# Patient Record
Sex: FEMALE | Race: WHITE | NOT HISPANIC OR LATINO | URBAN - METROPOLITAN AREA
[De-identification: names, ages, dates, MRNs, and addresses within clinical notes are randomized per-mention and may not be internally consistent; named-entity substitution may affect disease eponyms.]

---

## 2017-02-13 PROCEDURE — 88305 TISSUE EXAM BY PATHOLOGIST: CPT | Performed by: INTERNAL MEDICINE

## 2017-03-02 PROCEDURE — 86038 ANTINUCLEAR ANTIBODIES: CPT | Performed by: FAMILY MEDICINE

## 2017-03-02 PROCEDURE — 86431 RHEUMATOID FACTOR QUANT: CPT | Performed by: FAMILY MEDICINE

## 2017-09-26 ENCOUNTER — CHARTING TRANS (OUTPATIENT)
Dept: OBGYN | Age: 52
End: 2017-09-26

## 2017-09-26 ENCOUNTER — MYAURORA ACCOUNT LINK (OUTPATIENT)
Dept: OTHER | Age: 52
End: 2017-09-26

## 2017-11-03 ENCOUNTER — CHARTING TRANS (OUTPATIENT)
Dept: OTHER | Age: 52
End: 2017-11-03

## 2017-12-12 ENCOUNTER — IMAGING SERVICES (OUTPATIENT)
Dept: OTHER | Age: 52
End: 2017-12-12

## 2018-10-16 PROBLEM — M79.642 PAIN IN BOTH HANDS: Status: ACTIVE | Noted: 2018-10-16

## 2018-10-16 PROBLEM — G56.03 BILATERAL CARPAL TUNNEL SYNDROME: Status: ACTIVE | Noted: 2018-10-16

## 2018-10-16 PROBLEM — M79.641 PAIN IN BOTH HANDS: Status: ACTIVE | Noted: 2018-10-16

## 2018-11-28 VITALS
SYSTOLIC BLOOD PRESSURE: 126 MMHG | WEIGHT: 189 LBS | BODY MASS INDEX: 29.66 KG/M2 | DIASTOLIC BLOOD PRESSURE: 74 MMHG | HEIGHT: 67 IN

## 2019-01-12 ENCOUNTER — HOSPITAL ENCOUNTER (OUTPATIENT)
Dept: CT IMAGING | Facility: HOSPITAL | Age: 54
Discharge: HOME | End: 2019-01-12
Attending: FAMILY MEDICINE

## 2019-01-12 DIAGNOSIS — Z13.6 SCREENING FOR CARDIOVASCULAR CONDITION: ICD-10-CM

## 2020-01-02 ENCOUNTER — OFFICE VISIT (OUTPATIENT)
Dept: OBGYN | Age: 55
End: 2020-01-02

## 2020-01-02 VITALS
SYSTOLIC BLOOD PRESSURE: 122 MMHG | BODY MASS INDEX: 34.84 KG/M2 | WEIGHT: 222 LBS | DIASTOLIC BLOOD PRESSURE: 80 MMHG | HEIGHT: 67 IN

## 2020-01-02 DIAGNOSIS — Z01.419 WELL WOMAN EXAM WITH ROUTINE GYNECOLOGICAL EXAM: Primary | ICD-10-CM

## 2020-01-02 DIAGNOSIS — Z11.51 SPECIAL SCREENING EXAMINATION FOR HUMAN PAPILLOMAVIRUS (HPV): ICD-10-CM

## 2020-01-02 DIAGNOSIS — Z12.31 ENCOUNTER FOR SCREENING MAMMOGRAM FOR MALIGNANT NEOPLASM OF BREAST: ICD-10-CM

## 2020-01-02 PROCEDURE — 99396 PREV VISIT EST AGE 40-64: CPT | Performed by: OBSTETRICS & GYNECOLOGY

## 2020-01-02 PROCEDURE — 88142 CYTOPATH C/V THIN LAYER: CPT | Performed by: PATHOLOGY

## 2020-01-02 RX ORDER — CHOLECALCIFEROL (VITAMIN D3) 125 MCG
CAPSULE ORAL
COMMUNITY

## 2020-01-02 RX ORDER — CHLORAL HYDRATE 500 MG
CAPSULE ORAL
COMMUNITY

## 2020-01-08 LAB — AP REPORT: NORMAL

## 2020-01-14 LAB — HPV I/H RISK 4 DNA CVX QL NAA+PROBE: NORMAL

## 2020-08-11 ENCOUNTER — IMAGING SERVICES (OUTPATIENT)
Dept: OTHER | Age: 55
End: 2020-08-11

## 2020-10-26 ENCOUNTER — IMAGING SERVICES (OUTPATIENT)
Dept: MAMMOGRAPHY | Age: 55
End: 2020-10-26
Attending: OBSTETRICS & GYNECOLOGY

## 2020-10-26 DIAGNOSIS — Z12.31 VISIT FOR SCREENING MAMMOGRAM: ICD-10-CM

## 2020-10-26 PROCEDURE — 77067 SCR MAMMO BI INCL CAD: CPT | Performed by: RADIOLOGY

## 2020-10-26 PROCEDURE — 77063 BREAST TOMOSYNTHESIS BI: CPT | Performed by: RADIOLOGY

## 2021-01-26 PROBLEM — S42.201D CLOSED FRACTURE OF PROXIMAL END OF RIGHT HUMERUS WITH ROUTINE HEALING: Status: ACTIVE | Noted: 2021-01-26

## 2021-08-02 PROBLEM — U07.1 LAB TEST POSITIVE FOR DETECTION OF COVID-19 VIRUS: Status: ACTIVE | Noted: 2021-08-02

## 2021-12-02 PROBLEM — R73.09 ELEVATED HEMOGLOBIN A1C: Status: ACTIVE | Noted: 2021-12-02

## 2021-12-02 PROBLEM — E55.9 VITAMIN D DEFICIENCY: Status: ACTIVE | Noted: 2021-12-02

## 2022-02-23 ENCOUNTER — OFFICE VISIT (OUTPATIENT)
Dept: OBGYN | Age: 57
End: 2022-02-23

## 2022-02-23 VITALS
SYSTOLIC BLOOD PRESSURE: 150 MMHG | DIASTOLIC BLOOD PRESSURE: 88 MMHG | HEIGHT: 67 IN | BODY MASS INDEX: 35.31 KG/M2 | WEIGHT: 225 LBS | RESPIRATION RATE: 16 BRPM | TEMPERATURE: 97.2 F

## 2022-02-23 DIAGNOSIS — Z01.419 ENCOUNTER FOR ROUTINE GYNECOLOGICAL EXAMINATION WITH PAPANICOLAOU SMEAR OF CERVIX: Primary | ICD-10-CM

## 2022-02-23 DIAGNOSIS — Z01.419 GYNECOLOGIC EXAM NORMAL: ICD-10-CM

## 2022-02-23 DIAGNOSIS — Z11.51 SCREENING FOR HUMAN PAPILLOMAVIRUS (HPV): ICD-10-CM

## 2022-02-23 PROCEDURE — 88142 CYTOPATH C/V THIN LAYER: CPT | Performed by: PATHOLOGY

## 2022-02-23 PROCEDURE — 87624 HPV HI-RISK TYP POOLED RSLT: CPT | Performed by: OBSTETRICS & GYNECOLOGY

## 2022-02-23 PROCEDURE — 99396 PREV VISIT EST AGE 40-64: CPT | Performed by: OBSTETRICS & GYNECOLOGY

## 2022-02-23 PROCEDURE — 87624 HPV HI-RISK TYP POOLED RSLT: CPT | Performed by: CLINICAL MEDICAL LABORATORY

## 2022-02-23 PROCEDURE — PSEU9939 HPV, HIGH RISK: Performed by: CLINICAL MEDICAL LABORATORY

## 2022-02-23 RX ORDER — ALBUTEROL SULFATE 90 UG/1
AEROSOL, METERED RESPIRATORY (INHALATION)
COMMUNITY
Start: 2021-11-05

## 2022-02-23 RX ORDER — MULTIVIT WITH MINERALS/LUTEIN
1000 TABLET ORAL DAILY
COMMUNITY

## 2022-02-23 RX ORDER — FLUTICASONE PROPIONATE 50 MCG
1 SPRAY, SUSPENSION (ML) NASAL
COMMUNITY

## 2022-02-28 ENCOUNTER — TELEPHONE (OUTPATIENT)
Dept: OBGYN | Age: 57
End: 2022-02-28

## 2022-02-28 DIAGNOSIS — N64.4 BREAST PAIN, RIGHT: Primary | ICD-10-CM

## 2022-02-28 LAB — AP REPORT: NORMAL

## 2022-03-01 ENCOUNTER — LAB REQUISITION (OUTPATIENT)
Dept: LAB | Age: 57
End: 2022-03-01

## 2022-03-01 DIAGNOSIS — Z11.51 ENCOUNTER FOR SCREENING FOR HUMAN PAPILLOMAVIRUS (HPV): ICD-10-CM

## 2022-03-01 DIAGNOSIS — Z01.419 ENCOUNTER FOR GYNECOLOGICAL EXAMINATION (GENERAL) (ROUTINE) WITHOUT ABNORMAL FINDINGS: ICD-10-CM

## 2022-03-02 PROBLEM — F32.A CHRONIC DEPRESSION: Status: ACTIVE | Noted: 2022-03-02

## 2022-03-02 PROBLEM — G25.81 RESTLESS LEGS SYNDROME (RLS): Status: ACTIVE | Noted: 2022-03-02

## 2022-03-02 LAB
HPV16+18+45 E6+E7MRNA CVX NAA+PROBE: NEGATIVE
HPV16+18+45 E6+E7MRNA CVX NAA+PROBE: NEGATIVE
Lab: NORMAL
Lab: NORMAL

## 2022-09-29 PROBLEM — S42.201D CLOSED FRACTURE OF PROXIMAL END OF RIGHT HUMERUS WITH ROUTINE HEALING: Status: RESOLVED | Noted: 2021-01-26 | Resolved: 2022-09-29

## 2022-09-29 PROBLEM — J45.990 EXERCISE-INDUCED ASTHMA: Status: ACTIVE | Noted: 2022-09-29

## 2022-09-29 PROBLEM — R03.0 ELEVATED BLOOD PRESSURE READING: Status: ACTIVE | Noted: 2022-09-29

## 2022-09-29 PROBLEM — G47.61 PERIODIC LIMB MOVEMENT DISORDER: Status: ACTIVE | Noted: 2022-09-29

## 2022-09-29 PROBLEM — J45.990 EXERCISE-INDUCED ASTHMA (HCC): Status: ACTIVE | Noted: 2022-09-29

## 2022-09-29 PROBLEM — F32.9 MAJOR DEPRESSIVE DISORDER: Chronic | Status: ACTIVE | Noted: 2022-09-29

## 2022-09-29 PROBLEM — F40.10 SOCIAL ANXIETY DISORDER: Chronic | Status: ACTIVE | Noted: 2022-09-29

## 2022-09-29 PROBLEM — U07.1 LAB TEST POSITIVE FOR DETECTION OF COVID-19 VIRUS: Status: RESOLVED | Noted: 2021-08-02 | Resolved: 2022-09-29

## 2022-10-07 ENCOUNTER — LAB ENCOUNTER (OUTPATIENT)
Dept: LAB | Age: 57
End: 2022-10-07
Attending: PHYSICIAN ASSISTANT
Payer: COMMERCIAL

## 2022-10-07 DIAGNOSIS — F40.10 SOCIAL ANXIETY DISORDER: ICD-10-CM

## 2022-10-07 DIAGNOSIS — R53.83 FATIGUE, UNSPECIFIED TYPE: ICD-10-CM

## 2022-10-07 DIAGNOSIS — E55.9 VITAMIN D DEFICIENCY: ICD-10-CM

## 2022-10-07 DIAGNOSIS — Z13.89 SCREENING FOR SUBSTANCE ABUSE: ICD-10-CM

## 2022-10-07 DIAGNOSIS — Z13.0 SCREENING, ANEMIA, DEFICIENCY, IRON: ICD-10-CM

## 2022-10-07 DIAGNOSIS — Z13.1 SCREENING FOR DIABETES MELLITUS: ICD-10-CM

## 2022-10-07 DIAGNOSIS — Z13.29 SCREENING FOR THYROID DISORDER: ICD-10-CM

## 2022-10-07 DIAGNOSIS — F33.1 MODERATE EPISODE OF RECURRENT MAJOR DEPRESSIVE DISORDER (HCC): ICD-10-CM

## 2022-10-07 LAB
ALBUMIN SERPL-MCNC: 3.8 G/DL (ref 3.4–5)
ALBUMIN/GLOB SERPL: 1 {RATIO} (ref 1–2)
ALP LIVER SERPL-CCNC: 52 U/L
ALT SERPL-CCNC: 25 U/L
AMPHET UR QL SCN: NEGATIVE
ANION GAP SERPL CALC-SCNC: 5 MMOL/L (ref 0–18)
AST SERPL-CCNC: 18 U/L (ref 15–37)
BARBITURATES UR QL SCN: NEGATIVE
BENZODIAZ UR QL SCN: NEGATIVE
BILIRUB SERPL-MCNC: 0.6 MG/DL (ref 0.1–2)
BUN BLD-MCNC: 15 MG/DL (ref 7–18)
CALCIUM BLD-MCNC: 9 MG/DL (ref 8.5–10.1)
CANNABINOIDS UR QL SCN: NEGATIVE
CHLORIDE SERPL-SCNC: 108 MMOL/L (ref 98–112)
CO2 SERPL-SCNC: 26 MMOL/L (ref 21–32)
COCAINE UR QL: NEGATIVE
CREAT BLD-MCNC: 0.87 MG/DL
CREAT UR-SCNC: 78.9 MG/DL
ERYTHROCYTE [DISTWIDTH] IN BLOOD BY AUTOMATED COUNT: 12.6 %
ETHANOL UR-MCNC: NEGATIVE MG/DL
FASTING STATUS PATIENT QL REPORTED: YES
GFR SERPLBLD BASED ON 1.73 SQ M-ARVRAT: 78 ML/MIN/1.73M2 (ref 60–?)
GLOBULIN PLAS-MCNC: 3.8 G/DL (ref 2.8–4.4)
GLUCOSE BLD-MCNC: 90 MG/DL (ref 70–99)
HCT VFR BLD AUTO: 40 %
HGB BLD-MCNC: 12.9 G/DL
MCH RBC QN AUTO: 29.5 PG (ref 26–34)
MCHC RBC AUTO-ENTMCNC: 32.3 G/DL (ref 31–37)
MCV RBC AUTO: 91.3 FL
OPIATES UR QL SCN: NEGATIVE
OSMOLALITY SERPL CALC.SUM OF ELEC: 288 MOSM/KG (ref 275–295)
PCP UR QL SCN: NEGATIVE
PLATELET # BLD AUTO: 287 10(3)UL (ref 150–450)
POTASSIUM SERPL-SCNC: 3.9 MMOL/L (ref 3.5–5.1)
PROT SERPL-MCNC: 7.6 G/DL (ref 6.4–8.2)
RBC # BLD AUTO: 4.38 X10(6)UL
SODIUM SERPL-SCNC: 139 MMOL/L (ref 136–145)
T4 FREE SERPL-MCNC: 0.8 NG/DL (ref 0.8–1.7)
TSI SER-ACNC: 1.35 MIU/ML (ref 0.36–3.74)
VIT D+METAB SERPL-MCNC: 38.9 NG/ML (ref 30–100)
WBC # BLD AUTO: 4.9 X10(3) UL (ref 4–11)

## 2022-10-07 PROCEDURE — 84443 ASSAY THYROID STIM HORMONE: CPT

## 2022-10-07 PROCEDURE — 80307 DRUG TEST PRSMV CHEM ANLYZR: CPT

## 2022-10-07 PROCEDURE — 85027 COMPLETE CBC AUTOMATED: CPT

## 2022-10-07 PROCEDURE — 36415 COLL VENOUS BLD VENIPUNCTURE: CPT

## 2022-10-07 PROCEDURE — 84439 ASSAY OF FREE THYROXINE: CPT

## 2022-10-07 PROCEDURE — 82306 VITAMIN D 25 HYDROXY: CPT

## 2022-10-07 PROCEDURE — 80053 COMPREHEN METABOLIC PANEL: CPT

## 2022-11-16 PROBLEM — F34.1 PERSISTENT DEPRESSIVE DISORDER: Chronic | Status: ACTIVE | Noted: 2022-11-16

## 2023-03-01 ENCOUNTER — OFFICE VISIT (OUTPATIENT)
Dept: OBGYN | Age: 58
End: 2023-03-01

## 2023-03-01 VITALS
HEIGHT: 67 IN | BODY MASS INDEX: 36.96 KG/M2 | DIASTOLIC BLOOD PRESSURE: 70 MMHG | SYSTOLIC BLOOD PRESSURE: 138 MMHG | WEIGHT: 235.5 LBS

## 2023-03-01 DIAGNOSIS — Z12.31 ENCOUNTER FOR SCREENING MAMMOGRAM FOR BREAST CANCER: ICD-10-CM

## 2023-03-01 DIAGNOSIS — Z01.419 GYNECOLOGIC EXAM NORMAL: Primary | ICD-10-CM

## 2023-03-01 PROCEDURE — 99396 PREV VISIT EST AGE 40-64: CPT | Performed by: OBSTETRICS & GYNECOLOGY

## 2023-03-01 RX ORDER — GABAPENTIN 300 MG/1
300 CAPSULE ORAL 3 TIMES DAILY
COMMUNITY
Start: 2023-02-15

## 2023-03-01 RX ORDER — FLUOXETINE HYDROCHLORIDE 40 MG/1
40 CAPSULE ORAL
COMMUNITY
Start: 2023-02-22

## 2023-04-26 ENCOUNTER — TELEPHONE (OUTPATIENT)
Dept: OBGYN | Age: 58
End: 2023-04-26

## 2024-03-28 ENCOUNTER — APPOINTMENT (OUTPATIENT)
Dept: GENERAL RADIOLOGY | Facility: HOSPITAL | Age: 59
End: 2024-03-28
Attending: EMERGENCY MEDICINE
Payer: COMMERCIAL

## 2024-03-28 ENCOUNTER — HOSPITAL ENCOUNTER (EMERGENCY)
Facility: HOSPITAL | Age: 59
Discharge: HOME | End: 2024-03-28
Attending: EMERGENCY MEDICINE
Payer: COMMERCIAL

## 2024-03-28 VITALS
BODY MASS INDEX: 38 KG/M2 | SYSTOLIC BLOOD PRESSURE: 132 MMHG | RESPIRATION RATE: 16 BRPM | WEIGHT: 240 LBS | HEART RATE: 90 BPM | OXYGEN SATURATION: 99 % | TEMPERATURE: 99 F | DIASTOLIC BLOOD PRESSURE: 71 MMHG

## 2024-03-28 DIAGNOSIS — R33.9 URINARY RETENTION: ICD-10-CM

## 2024-03-28 DIAGNOSIS — K59.00 CONSTIPATION, UNSPECIFIED CONSTIPATION TYPE: ICD-10-CM

## 2024-03-28 DIAGNOSIS — K56.41 FECAL IMPACTION (HCC): Primary | ICD-10-CM

## 2024-03-28 LAB
ANION GAP SERPL CALC-SCNC: 7 MMOL/L (ref 0–18)
BUN BLD-MCNC: 15 MG/DL (ref 9–23)
CALCIUM BLD-MCNC: 9.5 MG/DL (ref 8.5–10.1)
CHLORIDE SERPL-SCNC: 106 MMOL/L (ref 98–112)
CO2 SERPL-SCNC: 23 MMOL/L (ref 21–32)
CREAT BLD-MCNC: 1.17 MG/DL
EGFRCR SERPLBLD CKD-EPI 2021: 54 ML/MIN/1.73M2 (ref 60–?)
GLUCOSE BLD-MCNC: 123 MG/DL (ref 70–99)
OSMOLALITY SERPL CALC.SUM OF ELEC: 284 MOSM/KG (ref 275–295)
POTASSIUM SERPL-SCNC: 4.4 MMOL/L (ref 3.5–5.1)
SODIUM SERPL-SCNC: 136 MMOL/L (ref 136–145)

## 2024-03-28 PROCEDURE — 74019 RADEX ABDOMEN 2 VIEWS: CPT | Performed by: EMERGENCY MEDICINE

## 2024-03-28 PROCEDURE — 80048 BASIC METABOLIC PNL TOTAL CA: CPT | Performed by: EMERGENCY MEDICINE

## 2024-03-28 PROCEDURE — 36415 COLL VENOUS BLD VENIPUNCTURE: CPT

## 2024-03-28 PROCEDURE — 99284 EMERGENCY DEPT VISIT MOD MDM: CPT

## 2024-03-28 RX ORDER — POLYETHYLENE GLYCOL 3350 17 G/17G
17 POWDER, FOR SOLUTION ORAL DAILY PRN
Qty: 10 EACH | Refills: 0 | Status: SHIPPED | OUTPATIENT
Start: 2024-03-28 | End: 2024-04-04

## 2024-03-28 NOTE — ED INITIAL ASSESSMENT (HPI)
C/o of constipation, last BM yesterday, tried OTC medications at home with no changes.  now with urinary retention started this morning at 0800

## 2024-03-29 NOTE — ED PROVIDER NOTES
Patient Seen in: [facility] Emergency Department      History     Chief Complaint   Patient presents with    Constipation    Urinary Symptoms     Stated Complaint: constipation/urinary retention since this morning    Subjective:   HPI    59-year-old female presents emergency room for evaluation of constipation and urinary retention.  Patient states she has not moved her bowels since yesterday, states she has been trying to move her bowels today and feels there is a large \"stool ball \"stuck in her lower rectum.  Patient states she also has not been able to urinate starting this morning.  Patient denies back pain.  Denies saddle anesthesia.  Denies fevers or chills.    Objective:   Past Medical History:   Diagnosis Date    Closed fracture of proximal end of right humerus with routine healing 1/26/2021    Lab test positive for detection of COVID-19 virus 8/2/2021    Pt reported 7/21/21 covid test positive at external facility     Melanoma (HCC) 2000    melanoma - right middle finger              Past Surgical History:   Procedure Laterality Date    CARPAL TUNNEL RELEASE Right 2022    CARPAL TUNNEL RELEASE Left 2022    COLONOSCOPY  02/2017     diverticulosis, hemorrhoids, polyp (7 mm serrated polyp). repeat 5 years       COLONOSCOPY N/A 02/13/2017    Procedure: COLONOSCOPY, POSSIBLE BIOPSY, POSSIBLE POLYPECTOMY 95260;  Surgeon: Gatito Wylie MD;  [place] [facility] [place] [facility] [place], [facility]    CT HEART W/ CALCIUM SCORING  1/24/14 - [facility] [place]    score 0    OTHER SURGICAL HISTORY      sentinel node biopsy    OTHER SURGICAL HISTORY      benign breast lump    OTHER SURGICAL HISTORY      many mole removals    PLANTAR FASCIA RELEASE Right     Gastrocnemius recession                Social History     Socioeconomic History    Marital status:    Occupational History    Occupation: homemaker   Tobacco Use    Smoking status: Never    Smokeless tobacco: Never   Substance and Sexual Activity    Alcohol use: No     Comment: 2  monthly    Drug use: No   Other Topics Concern    Seat Belt Yes   Social History Narrative    .  Homemaker.  Non-smoker.  No alcohol use.              Review of Systems    Positive for stated complaint: constipation/urinary retention since this morning  Other systems are as noted in HPI.  Constitutional and vital signs reviewed.      All other systems reviewed and negative except as noted above.    Physical Exam     ED Triage Vitals [03/28/24 1807]   BP (!) 164/93   Pulse 98   Resp 18   Temp 99.2 °F (37.3 °C)   Temp src Temporal   SpO2 98 %   O2 Device        Current:BP (!) 164/93   Pulse 98   Temp 99.2 °F (37.3 °C) (Temporal)   Resp 18   Wt 108.9 kg   LMP  (LMP Unknown)   SpO2 98%   BMI 37.67 kg/m²         Physical Exam    GENERAL: Patient is awake, alert, well-appearing, in no acute distress.  HEENT: no scleral icterus.  Mucous membranes are moist,  HEART: Regular rate and rhythm, no murmurs.  LUNGS: Clear to auscultation bilaterally.  No Rales, no rhonchi, no wheezing, no stridor.  ABDOMEN: Soft, nondistended,non tender, bowel sounds are present, no rebound, no rigidity, no guarding.no pulsatile masses. No CVA tenderness  Rectal exam: Performed with nurse Sara as chaperone-good rectal tone, large amount of firm stool in rectal vault  EXTREMITIES: No peripheral edema, no calf tenderness  NEUROLOGIC EXAM: No saddle anesthesia, muscle strength 5/5 bilateral lower extremities      ED Course     Labs Reviewed   BASIC METABOLIC PANEL (8) - Abnormal; Notable for the following components:       Result Value    Glucose 123 (*)     Creatinine 1.17 (*)     eGFR-Cr 54 (*)     All other components within normal limits        Fecal disimpaction note: Performed with nurse chaperone Sara.  Large amount of stool was removed, patient tolerated procedure well.              MDM        Differential diagnosis before testing includes but not limited to obstruction, fecal impaction, acute kidney injury, electrolyte  abnormality, which is a medical condition that poses a threat to life/function    Radiographic images  I personally reviewed the radiographs and my individual interpretation shows stool noted in colon, no free air  I also reviewed the official reports that showed large amount of fecal material throughout colon may be seen with constipation, nonspecific bowel gas pattern no free air      Course of Events during Emergency Room Visit include Smith catheter was placed by nursing staff to decompress the bladder after bedside bladder scan revealed 1000 cc of urine.  Patient reports feeling much better after this intervention.  Digital disimpaction was performed.  Patient then had soap side enema performed with large resultant bowel movement.  Patient is now urinating without difficulty on her own.  Chemistry was unremarkable.  I discussed continue supportive care, will prescribe MiraLAX.  Stable hydrated.  Follow-up with primary care return if any change or symptoms.  Patient well-appearing agrees plan discharge good condition with     Shared decision making was utilized       Discharge  I have discussed with the patient the results of test, differential diagnosis, treatment plan, warning signs and symptoms which should prompt immediate return.  They expressed understanding of these instructions and agrees to the following plan provided.  They were given written discharge instructions and agrees to return for any concerns and voiced understanding and all questions were answered.    Note to patient: The 21st Century Cures Act makes medical notes like these available to patients in the interest of transparency. However, this is a medical document intended as peer to peer communication. It is written in medical language and may contain abbreviations or verbiage that are unfamiliar. It may appear blunt or direct. Medical documents are intended to carry relevant information, facts as evident, and the clinical opinion of  the practitioner.                                            Medical Decision Making      Disposition and Plan     Clinical Impression:  1. Fecal impaction (HCC)    2. Constipation, unspecified constipation type    3. Urinary retention         Disposition:  Discharge  3/28/2024 11:22 pm    Follow-up:  Sunshine Lance MD  [address] [address]  [phone]    Follow up in 2 day(s)            Medications Prescribed:  Current Discharge Medication List        START taking these medications    Details   polyethylene glycol, PEG 3350, 17 g Oral Powd Pack Take 17 g by mouth daily as needed.  Qty: 10 each, Refills: 0

## 2024-05-16 ENCOUNTER — HOSPITAL ENCOUNTER (INPATIENT)
Facility: HOSPITAL | Age: 59
LOS: 1 days | Discharge: HOME | DRG: 189 | End: 2024-05-19
Attending: EMERGENCY MEDICINE | Admitting: INTERNAL MEDICINE

## 2024-05-16 ENCOUNTER — APPOINTMENT (OUTPATIENT)
Dept: GENERAL RADIOLOGY | Facility: HOSPITAL | Age: 59
DRG: 189 | End: 2024-05-16

## 2024-05-16 ENCOUNTER — APPOINTMENT (OUTPATIENT)
Dept: GENERAL RADIOLOGY | Facility: HOSPITAL | Age: 59
DRG: 189 | End: 2024-05-16
Attending: EMERGENCY MEDICINE

## 2024-05-16 DIAGNOSIS — J45.51 SEVERE PERSISTENT ASTHMA WITH EXACERBATION (HCC): Primary | ICD-10-CM

## 2024-05-16 PROBLEM — R73.9 HYPERGLYCEMIA: Status: ACTIVE | Noted: 2024-05-16

## 2024-05-16 LAB
ALBUMIN SERPL-MCNC: 4 G/DL (ref 3.4–5)
ALBUMIN/GLOB SERPL: 1 {RATIO} (ref 1–2)
ALP LIVER SERPL-CCNC: 60 U/L
ALT SERPL-CCNC: 27 U/L
ANION GAP SERPL CALC-SCNC: 3 MMOL/L (ref 0–18)
AST SERPL-CCNC: 21 U/L (ref 15–37)
BASOPHILS # BLD AUTO: 0.03 X10(3) UL (ref 0–0.2)
BASOPHILS NFR BLD AUTO: 0.5 %
BILIRUB SERPL-MCNC: 0.4 MG/DL (ref 0.1–2)
BUN BLD-MCNC: 20 MG/DL (ref 9–23)
CALCIUM BLD-MCNC: 9.1 MG/DL (ref 8.5–10.1)
CHLORIDE SERPL-SCNC: 106 MMOL/L (ref 98–112)
CO2 SERPL-SCNC: 28 MMOL/L (ref 21–32)
CREAT BLD-MCNC: 1.13 MG/DL
EGFRCR SERPLBLD CKD-EPI 2021: 56 ML/MIN/1.73M2 (ref 60–?)
EOSINOPHIL # BLD AUTO: 0.29 X10(3) UL (ref 0–0.7)
EOSINOPHIL NFR BLD AUTO: 4.8 %
ERYTHROCYTE [DISTWIDTH] IN BLOOD BY AUTOMATED COUNT: 13 %
FLUAV + FLUBV RNA SPEC NAA+PROBE: NEGATIVE
FLUAV + FLUBV RNA SPEC NAA+PROBE: NEGATIVE
GLOBULIN PLAS-MCNC: 3.9 G/DL (ref 2.8–4.4)
GLUCOSE BLD-MCNC: 109 MG/DL (ref 70–99)
HCT VFR BLD AUTO: 40.7 %
HGB BLD-MCNC: 14.1 G/DL
IMM GRANULOCYTES # BLD AUTO: 0.02 X10(3) UL (ref 0–1)
IMM GRANULOCYTES NFR BLD: 0.3 %
LYMPHOCYTES # BLD AUTO: 1.28 X10(3) UL (ref 1–4)
LYMPHOCYTES NFR BLD AUTO: 21.1 %
MCH RBC QN AUTO: 29.8 PG (ref 26–34)
MCHC RBC AUTO-ENTMCNC: 34.6 G/DL (ref 31–37)
MCV RBC AUTO: 86 FL
MONOCYTES # BLD AUTO: 1.09 X10(3) UL (ref 0.1–1)
MONOCYTES NFR BLD AUTO: 18 %
NEUTROPHILS # BLD AUTO: 3.36 X10 (3) UL (ref 1.5–7.7)
NEUTROPHILS # BLD AUTO: 3.36 X10(3) UL (ref 1.5–7.7)
NEUTROPHILS NFR BLD AUTO: 55.3 %
OSMOLALITY SERPL CALC.SUM OF ELEC: 287 MOSM/KG (ref 275–295)
PLATELET # BLD AUTO: 242 10(3)UL (ref 150–450)
POTASSIUM SERPL-SCNC: 3.8 MMOL/L (ref 3.5–5.1)
PROT SERPL-MCNC: 7.9 G/DL (ref 6.4–8.2)
RBC # BLD AUTO: 4.73 X10(6)UL
RSV RNA SPEC NAA+PROBE: NEGATIVE
SARS-COV-2 RNA RESP QL NAA+PROBE: NOT DETECTED
SODIUM SERPL-SCNC: 137 MMOL/L (ref 136–145)
WBC # BLD AUTO: 6.1 X10(3) UL (ref 4–11)

## 2024-05-16 PROCEDURE — 85025 COMPLETE CBC W/AUTO DIFF WBC: CPT | Performed by: EMERGENCY MEDICINE

## 2024-05-16 PROCEDURE — 0241U SARS-COV-2/FLU A AND B/RSV BY PCR (GENEXPERT): CPT | Performed by: EMERGENCY MEDICINE

## 2024-05-16 PROCEDURE — 71045 X-RAY EXAM CHEST 1 VIEW: CPT | Performed by: EMERGENCY MEDICINE

## 2024-05-16 PROCEDURE — 94645 CONT INHLJ TX EACH ADDL HOUR: CPT

## 2024-05-16 PROCEDURE — 94644 CONT INHLJ TX 1ST HOUR: CPT

## 2024-05-16 PROCEDURE — 99285 EMERGENCY DEPT VISIT HI MDM: CPT

## 2024-05-16 PROCEDURE — 96374 THER/PROPH/DIAG INJ IV PUSH: CPT

## 2024-05-16 PROCEDURE — 93010 ELECTROCARDIOGRAM REPORT: CPT

## 2024-05-16 PROCEDURE — 80053 COMPREHEN METABOLIC PANEL: CPT | Performed by: EMERGENCY MEDICINE

## 2024-05-16 PROCEDURE — 93005 ELECTROCARDIOGRAM TRACING: CPT

## 2024-05-16 RX ORDER — IPRATROPIUM BROMIDE AND ALBUTEROL SULFATE 2.5; .5 MG/3ML; MG/3ML
3 SOLUTION RESPIRATORY (INHALATION)
Status: DISCONTINUED | OUTPATIENT
Start: 2024-05-16 | End: 2024-05-17

## 2024-05-16 RX ORDER — METHYLPREDNISOLONE SODIUM SUCCINATE 125 MG/2ML
125 INJECTION, POWDER, LYOPHILIZED, FOR SOLUTION INTRAMUSCULAR; INTRAVENOUS ONCE
Status: COMPLETED | OUTPATIENT
Start: 2024-05-16 | End: 2024-05-16

## 2024-05-17 LAB
ANION GAP SERPL CALC-SCNC: 8 MMOL/L (ref 0–18)
ATRIAL RATE: 88 BPM
BASOPHILS # BLD AUTO: 0.02 X10(3) UL (ref 0–0.2)
BASOPHILS NFR BLD AUTO: 0.3 %
BUN BLD-MCNC: 19 MG/DL (ref 9–23)
CALCIUM BLD-MCNC: 9.1 MG/DL (ref 8.5–10.1)
CHLORIDE SERPL-SCNC: 106 MMOL/L (ref 98–112)
CO2 SERPL-SCNC: 22 MMOL/L (ref 21–32)
CREAT BLD-MCNC: 1.1 MG/DL
EGFRCR SERPLBLD CKD-EPI 2021: 58 ML/MIN/1.73M2 (ref 60–?)
EOSINOPHIL # BLD AUTO: 0 X10(3) UL (ref 0–0.7)
EOSINOPHIL NFR BLD AUTO: 0 %
ERYTHROCYTE [DISTWIDTH] IN BLOOD BY AUTOMATED COUNT: 13.1 %
GLUCOSE BLD-MCNC: 162 MG/DL (ref 70–99)
HCT VFR BLD AUTO: 43.4 %
HGB BLD-MCNC: 14 G/DL
IMM GRANULOCYTES # BLD AUTO: 0.03 X10(3) UL (ref 0–1)
IMM GRANULOCYTES NFR BLD: 0.5 %
LYMPHOCYTES # BLD AUTO: 0.92 X10(3) UL (ref 1–4)
LYMPHOCYTES NFR BLD AUTO: 15.9 %
MAGNESIUM SERPL-MCNC: 2.3 MG/DL (ref 1.6–2.6)
MCH RBC QN AUTO: 28.8 PG (ref 26–34)
MCHC RBC AUTO-ENTMCNC: 32.3 G/DL (ref 31–37)
MCV RBC AUTO: 89.3 FL
MONOCYTES # BLD AUTO: 0.16 X10(3) UL (ref 0.1–1)
MONOCYTES NFR BLD AUTO: 2.8 %
NEUTROPHILS # BLD AUTO: 4.66 X10 (3) UL (ref 1.5–7.7)
NEUTROPHILS # BLD AUTO: 4.66 X10(3) UL (ref 1.5–7.7)
NEUTROPHILS NFR BLD AUTO: 80.5 %
OSMOLALITY SERPL CALC.SUM OF ELEC: 288 MOSM/KG (ref 275–295)
P AXIS: 57 DEGREES
P-R INTERVAL: 150 MS
PLATELET # BLD AUTO: 279 10(3)UL (ref 150–450)
POTASSIUM SERPL-SCNC: 4.1 MMOL/L (ref 3.5–5.1)
Q-T INTERVAL: 384 MS
QRS DURATION: 98 MS
QTC CALCULATION (BEZET): 464 MS
R AXIS: 38 DEGREES
RBC # BLD AUTO: 4.86 X10(6)UL
SODIUM SERPL-SCNC: 136 MMOL/L (ref 136–145)
T AXIS: 43 DEGREES
VENTRICULAR RATE: 88 BPM
WBC # BLD AUTO: 5.8 X10(3) UL (ref 4–11)

## 2024-05-17 PROCEDURE — 94640 AIRWAY INHALATION TREATMENT: CPT

## 2024-05-17 PROCEDURE — 94799 UNLISTED PULMONARY SVC/PX: CPT

## 2024-05-17 PROCEDURE — 83735 ASSAY OF MAGNESIUM: CPT | Performed by: INTERNAL MEDICINE

## 2024-05-17 PROCEDURE — 85025 COMPLETE CBC W/AUTO DIFF WBC: CPT | Performed by: INTERNAL MEDICINE

## 2024-05-17 PROCEDURE — 80048 BASIC METABOLIC PNL TOTAL CA: CPT | Performed by: INTERNAL MEDICINE

## 2024-05-17 PROCEDURE — 94660 CPAP INITIATION&MGMT: CPT

## 2024-05-17 RX ORDER — METHYLPREDNISOLONE SODIUM SUCCINATE 40 MG/ML
40 INJECTION, POWDER, LYOPHILIZED, FOR SOLUTION INTRAMUSCULAR; INTRAVENOUS EVERY 8 HOURS
Status: DISCONTINUED | OUTPATIENT
Start: 2024-05-17 | End: 2024-05-19

## 2024-05-17 RX ORDER — ACETAMINOPHEN 500 MG
500 TABLET ORAL EVERY 4 HOURS PRN
Status: DISCONTINUED | OUTPATIENT
Start: 2024-05-17 | End: 2024-05-19

## 2024-05-17 RX ORDER — POLYETHYLENE GLYCOL 3350 17 G/17G
17 POWDER, FOR SOLUTION ORAL DAILY PRN
Status: DISCONTINUED | OUTPATIENT
Start: 2024-05-17 | End: 2024-05-19

## 2024-05-17 RX ORDER — ONDANSETRON 2 MG/ML
4 INJECTION INTRAMUSCULAR; INTRAVENOUS EVERY 6 HOURS PRN
Status: DISCONTINUED | OUTPATIENT
Start: 2024-05-17 | End: 2024-05-19

## 2024-05-17 RX ORDER — HEPARIN SODIUM 5000 [USP'U]/ML
5000 INJECTION, SOLUTION INTRAVENOUS; SUBCUTANEOUS EVERY 8 HOURS SCHEDULED
Status: DISCONTINUED | OUTPATIENT
Start: 2024-05-17 | End: 2024-05-19

## 2024-05-17 RX ORDER — ENEMA 19; 7 G/133ML; G/133ML
1 ENEMA RECTAL ONCE AS NEEDED
Status: DISCONTINUED | OUTPATIENT
Start: 2024-05-17 | End: 2024-05-19

## 2024-05-17 RX ORDER — BISACODYL 10 MG
10 SUPPOSITORY, RECTAL RECTAL
Status: DISCONTINUED | OUTPATIENT
Start: 2024-05-17 | End: 2024-05-19

## 2024-05-17 RX ORDER — SENNOSIDES 8.6 MG
17.2 TABLET ORAL NIGHTLY PRN
Status: DISCONTINUED | OUTPATIENT
Start: 2024-05-17 | End: 2024-05-19

## 2024-05-17 RX ORDER — IPRATROPIUM BROMIDE AND ALBUTEROL SULFATE 2.5; .5 MG/3ML; MG/3ML
3 SOLUTION RESPIRATORY (INHALATION)
Status: DISCONTINUED | OUTPATIENT
Start: 2024-05-17 | End: 2024-05-19

## 2024-05-17 RX ORDER — IPRATROPIUM BROMIDE AND ALBUTEROL SULFATE 2.5; .5 MG/3ML; MG/3ML
3 SOLUTION RESPIRATORY (INHALATION) EVERY 4 HOURS PRN
Status: DISCONTINUED | OUTPATIENT
Start: 2024-05-17 | End: 2024-05-19

## 2024-05-17 RX ORDER — PROCHLORPERAZINE EDISYLATE 5 MG/ML
5 INJECTION INTRAMUSCULAR; INTRAVENOUS EVERY 8 HOURS PRN
Status: DISCONTINUED | OUTPATIENT
Start: 2024-05-17 | End: 2024-05-19

## 2024-05-17 NOTE — ED PROVIDER NOTES
Patient Seen in: [facility] Emergency Department      History     Chief Complaint   Patient presents with    Difficulty Breathing     Stated Complaint: trouble breathing x 1 day that worsened today, hx of asthma.    Subjective:   HPI    Patient is a 59-year-old woman who has a history of mild exercise-induced asthma presents with cough congestion and wheezing over the last week.  Started off with fevers and chills.  Says of viruses running through the house.  Was seen at the [facility] immediate care and sent to the ER.   is at bedside.  No history of blood clots.  Does have albuterol at home.  Recent Medrol Dosepak.  Says this is different than any asthma symptoms she has had in the past.  No other specific complaints.    Objective:   Past Medical History:    Closed fracture of proximal end of right humerus with routine healing    Lab test positive for detection of COVID-19 virus    Pt reported 7/21/21 covid test positive at external facility     Melanoma (HCC)    melanoma - right middle finger              Past Surgical History:   Procedure Laterality Date    Carpal tunnel release Right 2022    Carpal tunnel release Left 2022    Colonoscopy  02/2017     diverticulosis, hemorrhoids, polyp (7 mm serrated polyp). repeat 5 years       Colonoscopy N/A 02/13/2017    Procedure: COLONOSCOPY, POSSIBLE BIOPSY, POSSIBLE POLYPECTOMY 39571;  Surgeon: Gatito Wylie MD;  Location: [facility] [place] [facility], [facility]    Ct heart w/ calcium scoring  1/24/14 - [facility] [place]    score 0    Other surgical history      sentinel node biopsy    Other surgical history      benign breast lump    Other surgical history      many mole removals    Plantar fascia release Right     Gastrocnemius recession                Social History     Socioeconomic History    Marital status:    Occupational History    Occupation: homemaker   Tobacco Use    Smoking status: Never    Smokeless tobacco: Never   Vaping Use    Vaping status: Never Used   Substance  and Sexual Activity    Alcohol use: Not Currently     Comment: 2 monthly    Drug use: No   Other Topics Concern    Seat Belt Yes   Social History Narrative    .  Homemaker.  Non-smoker.  No alcohol use.              Review of Systems    Positive for stated complaint: trouble breathing x 1 day that worsened today, hx of asthma.  Other systems are as noted in HPI.  Constitutional and vital signs reviewed.      All other systems reviewed and negative except as noted above.    Physical Exam     ED Triage Vitals [05/16/24 1959]   /85   Pulse 93   Resp 22   Temp 98.6 °F (37 °C)   Temp src Oral   SpO2 92 %   O2 Device None (Room air)       Current Vitals:   Vital Signs  BP: 153/75  Pulse: 89  Resp: 22  Temp: 98.6 °F (37 °C)  Temp src: Oral  MAP (mmHg): 96    Oxygen Therapy  SpO2: 93 %  O2 Device: None (Room air)            Physical Exam    General: Dyspneic 59-year-old woman  HEENT: Normal cephalic atraumatic.  Nonicteric sclera.  Moist mucous membranes.  No meningismus.  No adenopathy  Lungs: Expiratory wheezing in all lung fields  Cardiac: Mild tachycardia.  No murmurs.  Regular rate and rhythm.  Abdomen: Soft and nontender throughout.  No rebound or guarding  Extremities: No clubbing/cyanosis/edema.  Skin: No rashes, no pallor  Neuro: Awake oriented ×3.  Nonfocal.  Good strength throughout    ED Course     Labs Reviewed   COMP METABOLIC PANEL (14) - Abnormal; Notable for the following components:       Result Value    Glucose 109 (*)     Creatinine 1.13 (*)     eGFR-Cr 56 (*)     All other components within normal limits   CBC W/ DIFFERENTIAL - Abnormal; Notable for the following components:    Monocyte Absolute 1.09 (*)     All other components within normal limits   SARS-COV-2/FLU A AND B/RSV BY PCR (GENEXPERT) - Normal    Narrative:     This test is intended for the qualitative detection and differentiation of SARS-CoV-2, influenza A, influenza B, and respiratory syncytial virus (RSV) viral RNA in  nasopharyngeal or nares swabs from individuals suspected of respiratory viral infection consistent with COVID-19 by their healthcare provider. Signs and symptoms of respiratory viral infection due to SARS-CoV-2, influenza, and RSV can be similar.    Test performed using the Xpert Xpress SARS-CoV-2/FLU/RSV (real time RT-PCR)  assay on the GeneXpert instrument, [employer], [place] [address].   This test is being used under the Food and Drug Administration's Emergency Use Authorization.    The authorized Fact Sheet for Healthcare Providers for this assay is available upon request from the laboratory.   CBC WITH DIFFERENTIAL WITH PLATELET    Narrative:     The following orders were created for panel order CBC With Differential With Platelet.  Procedure                               Abnormality         Status                     ---------                               -----------         ------                     CBC W/ DIFFERENTIAL[[id number]]          Abnormal            Final result                 Please view results for these tests on the individual orders.   RAINBOW DRAW LAVENDER   RAINBOW DRAW LIGHT GREEN   RAINBOW DRAW BLUE   RAINBOW DRAW GOLD     EKG    Rate, intervals and axes as noted on EKG Report.  Rate: 88  Rhythm: Sinus Rhythm  Reading: Normal sinus rhythm.  No acute ST-T wave changes.  Axis/intervals are noted.  Otherwise, agree with EKG report                 Chest x-ray: I personally reviewed the films and my independent interpertaion showed no acute pathology.  Official report reviewed   COVID, flu, RSV negative electrolytes reviewed  White count 6.1.  Monocyte predominance.  MDM      Patient is a 59-year-old woman who presents with viral URI symptoms and bronchospasm with wheezing on exam.  Differential includes asthma exacerbation, pneumonia, other.  Patient was put on an hour-long nebulization.  IV was placed.  She was hydrated and treated with IV steroids.  Will reassess after the neb.  Since she  was satting 90 to 92% initially on arrival.      Patient was treated with an hour-long neb.  Was treated with IV steroids.  Chest x-ray reviewed.  No clear pneumonia.  She was reassessed.  She continues to be quite bronchospastic and short of breath with saturations in the 88 to 90% range.  She was given a second hour-long neb.  She was reassessed during that neb but she continues to have bronchospasm.  Will admit for further care and treatment.                             Medical Decision Making      Disposition and Plan     Clinical Impression:  1. Severe persistent asthma with exacerbation (HCC)         Disposition:  There is no disposition on file for this visit.  There is no disposition time on file for this visit.    Follow-up:  No follow-up provider specified.        Medications Prescribed:  Current Discharge Medication List                     A total of 35 minutes of critical care time (exclusive of billable procedures) was administered to manage the patient's respiratory instability due to @HIS@severe asthma exacerbation..  This involved direct patient intervention, complex decision making, and/or extensive discussions with the patient, family, and clinical staff.  Patient was treated with 2 hour-long nebs multiple reassessments.  Was treated with IV steroids.  Discussion with consultants.

## 2024-05-17 NOTE — ED INITIAL ASSESSMENT (HPI)
CATRINA with wheezing, increased work of breathing , cough,fever and body aches since Tuesday . Seen at [facility] sent to ER for further eval

## 2024-05-17 NOTE — PROGRESS NOTES
NURSING ADMISSION NOTE      Patient admitted via Cart  Oriented to room 509.  Safety precautions initiated.  Bed in low position.  Call light in reach.    Received Patient 0200. Pt A&O X 4. 2L of O2. CPAP at night. IV Steroids. Scheduled nebs. No Tele. Heparin. Voids. Denies pain. Regular diet. Up self. Patient and spouse updated on POC. MD paged for home meds. No further needs at this time.

## 2024-05-17 NOTE — PLAN OF CARE
Problem: Patient/Family Goals  Goal: Patient/Family Long Term Goal  Description: Patient's Long Term Goal: discharge home    Interventions:  - Consult to Hosp  - Nebs  - IV Steroids  - Wean oxygen  - See additional Care Plan goals for specific interventions  Outcome: Progressing  Goal: Patient/Family Short Term Goal  Description: Patient's Short Term Goal: improve sob    Interventions:   - Consult to Hosp  - Nebs  - IV Steroids  - Wean oxygen   - See additional Care Plan goals for specific interventions  Outcome: Progressing     Problem: PAIN - ADULT  Goal: Verbalizes/displays adequate comfort level or patient's stated pain goal  Description: INTERVENTIONS:  - Encourage pt to monitor pain and request assistance  - Assess pain using appropriate pain scale  - Administer analgesics based on type and severity of pain and evaluate response  - Implement non-pharmacological measures as appropriate and evaluate response  - Consider cultural and social influences on pain and pain management  - Manage/alleviate anxiety  - Utilize distraction and/or relaxation techniques  - Monitor for opioid side effects  - Notify MD/LIP if interventions unsuccessful or patient reports new pain  - Anticipate increased pain with activity and pre-medicate as appropriate  Outcome: Progressing     Problem: RISK FOR INFECTION - ADULT  Goal: Absence of fever/infection during anticipated neutropenic period  Description: INTERVENTIONS  - Monitor WBC  - Administer growth factors as ordered  - Implement neutropenic guidelines  Outcome: Progressing     Problem: SAFETY ADULT - FALL  Goal: Free from fall injury  Description: INTERVENTIONS:  - Assess pt frequently for physical needs  - Identify cognitive and physical deficits and behaviors that affect risk of falls.  - [place] fall precautions as indicated by assessment.  - Educate pt/family on patient safety including physical limitations  - Instruct pt to call for assistance with activity based on  assessment  - Modify environment to reduce risk of injury  - Provide assistive devices as appropriate  - Consider OT/PT consult to assist with strengthening/mobility  - Encourage toileting schedule  Outcome: Progressing     Problem: DISCHARGE PLANNING  Goal: Discharge to home or other facility with appropriate resources  Description: INTERVENTIONS:  - Identify barriers to discharge w/pt and caregiver  - Include patient/family/discharge partner in discharge planning  - Arrange for needed discharge resources and transportation as appropriate  - Identify discharge learning needs (meds, wound care, etc)  - Arrange for interpreters to assist at discharge as needed  - Consider post-discharge preferences of patient/family/discharge partner  - Complete POLST form as appropriate  - Assess patient's ability to be responsible for managing their own health  - Refer to Case Management Department for coordinating discharge planning if the patient needs post-hospital services based on physician/LIP order or complex needs related to functional status, cognitive ability or social support system  Outcome: Progressing

## 2024-05-17 NOTE — H&P
[facility] Hospitalist History and Physical      Chief Complaint   Patient presents with    Difficulty Breathing        PCP: Sunshine Lance MD      History of Present Illness: Patient is a 59 year old female with PMH sig for exercise induced asthma, obesity/ROBBIE p/w SOB and wheezing. Reported viral URI symptoms earlier this week that went through her family. She started feeling better but then she started getting SOB and winded very quickly. Feeling SHEPPARD and was getting fatigued.  In the ED she was hyeprtensive and saturating low 90s on RA. Started on iv steroids, nebulizers and admitted for asthma exacerbation.     Past Medical History:    COVID    Humerus fracture    Melanoma (HCC)    melanoma - right middle finger    Obesity    ROBBIE (obstructive sleep apnea)    RLS (restless legs syndrome)      Past Surgical History:   Procedure Laterality Date    Carpal tunnel release Right 2022    Carpal tunnel release Left 2022    Colonoscopy  02/2017     diverticulosis, hemorrhoids, polyp (7 mm serrated polyp). repeat 5 years       Colonoscopy N/A 02/13/2017    Procedure: COLONOSCOPY, POSSIBLE BIOPSY, POSSIBLE POLYPECTOMY 74760;  Surgeon: Gatito Wylie MD;  Location: [facility] [place] [facility], [facility]    Ct heart w/ calcium scoring  1/24/14 - [facility] [place]    score 0    Other surgical history      sentinel node biopsy    Other surgical history      benign breast lump    Other surgical history      many mole removals    Plantar fascia release Right     Gastrocnemius recession        ALL:  Allergies   Allergen Reactions    Codeine NAUSEA AND VOMITING        No current outpatient medications on file.       Social History     Tobacco Use    Smoking status: Never    Smokeless tobacco: Never   Substance Use Topics    Alcohol use: Not Currently     Comment: once a month        Fam Hx  Family History   Problem Relation Age of Onset    Depression Mother         For a long time after pt's father passed away    Diabetes Father         Type 2    High Blood  Pressure Brother     Lipids Brother     Diabetes Maternal Grandmother         Type 2    Stroke Maternal Grandfather         at 97 y/o    Diabetes Paternal Grandmother         Type 2    Heart Disorder Paternal Grandfather         May have had a heart issue    No Known Problems Daughter     No Known Problems Daughter     ADHD Son     Depression Son         May have depression    Melanoma Cousin     Arthritis Cousin     Other (Lupus) Maternal Cousin Female     Bipolar Disorder Neg     Schizophrenia Neg     Substance Abuse Neg     Suicide History Neg        Review of Systems  Comprehensive ROS reviewed and negative except for what is stated in HPI.      OBJECTIVE:  /82 (BP Location: Left arm)   Pulse 93   Temp 98.4 °F (36.9 °C) (Oral)   Resp 18   Ht 5' 7\" (1.702 m)   Wt 242 lb 6.4 oz (110 kg)   LMP  (LMP Unknown)   SpO2 92%   BMI 37.97 kg/m²   General:  Alert, no distress, appears stated age.    Head:  Normocephalic, without obvious abnormality, atraumatic.   Eyes:  Sclera anicteric, No conjunctival pallor, EOMs intact.    Nose: Nares normal. Septum midline. Mucosa normal. No drainage.   Throat: Lips, mucosa, and tongue normal. Teeth and gums normal.   Neck: Supple, symmetrical, trachea midline, no cervical or supraclavicular lymph adenopathy, thyroid: no enlargment/tenderness/nodules appreciated   Lungs:   End expiratory wheezing with poor air movement bilaterally. Normal effort   Chest wall:  No tenderness or deformity.   Heart:  Regular rate and rhythm, S1, S2 normal, no murmur, rub or gallop appreciated   Abdomen:   Soft, non-tender. Bowel sounds normal. No masses,  No organomegaly. Non distended   Extremities: Extremities normal, atraumatic, no cyanosis or edema.   Skin: Skin color, texture, turgor normal. No rashes or lesions.    Neurologic: Normal strength, no focal deficit appreciated     Data Review:    LABS:   Lab Results   Component Value Date    WBC 5.8 05/17/2024    HGB 14.0 05/17/2024    HCT  43.4 05/17/2024    .0 05/17/2024    CREATSERUM 1.10 05/17/2024    BUN 19 05/17/2024     05/17/2024    K 4.1 05/17/2024     05/17/2024    CO2 22.0 05/17/2024     05/17/2024    CA 9.1 05/17/2024    ALB 4.0 05/16/2024    ALKPHO 60 05/16/2024    BILT 0.4 05/16/2024    TP 7.9 05/16/2024    AST 21 05/16/2024    ALT 27 05/16/2024    MG 2.3 05/17/2024       CXR: All imaging personally reviewed.      Radiology: XR CHEST AP PORTABLE  (CPT=71045)    Result Date: 5/16/2024  PROCEDURE:  XR CHEST AP PORTABLE  (CPT=71045)  TECHNIQUE:  AP chest radiograph was obtained.  COMPARISON:  None.  INDICATIONS:  trouble breathing x 1 day that worsened today, hx of asthma.  PATIENT STATED HISTORY: (As transcribed by Technologist)  Patient offered no additional history at this time.    FINDINGS:  No focal consolidation.  No pleural effusion.  No pneumothorax.  No pulmonary edema.  The cardiomediastinal silhouette is within normal limits.  No acute osseous findings.            CONCLUSION:  No acute cardiopulmonary findings.   LOCATION:  [place]      Dictated by (CST): Nicanor Mcdonnell MD on 5/16/2024 at 8:37 PM     Finalized by (CST): Nicanor Mcdonnell MD on 5/16/2024 at 8:37 PM          Assessment/Plan:     59 year old female with PMH sig for exercise induced asthma, obesity/ROBBIE p/w SOB and wheezing.    Acute hypoxic respiratory failure  Acute asthma exacerbation triggered by viral URI  - IV steroids  - scheduled nebuilzers  - consult pulm - may need long acting inhaled corticosteroid  - wean O2 as tolerated  - viral symptoms resolved, viral panel would be academic and wouldn't change course of treatment    Obesity/ROBBIE  - cpap qhs    FEN: regular diet, PT/OT  Proph: SCDs, lovenox  Code status: Full code     Outpatient records or previous hospital records reviewed.   [facility] hospitalist to continue to follow patient while in house      Venancio Jorge MD  [facility]  Hospitalist  Message over  Epic/Kathy/AlertMD  Pager: [phone]

## 2024-05-17 NOTE — ED QUICK NOTES
Orders for admission, patient is aware of plan and ready to go upstairs. Any questions, please call ED RN Lana at extension [phone].     Patient Covid vaccination status: Fully vaccinated     COVID Test Ordered in ED: SARS-CoV-2/Flu A and B/RSV by PCR (GeneXpert)    COVID Suspicion at Admission: N/A    Running Infusions:  None    Mental Status/LOC at time of transport: Alert x4    Other pertinent information:   CIWA score: N/A   NIH score:  N/A

## 2024-05-17 NOTE — PLAN OF CARE
Problem:SOB/Asthma  Data:Pt. A/O x4. VSS. Denies pain. Pt. Reports SOB with any activity. O2 weaned to 2L per n/c and sats maintained in low 90's on 2L. Continue IV Steroids and nebs per RT.  Action:Monitor VS and tele. Assessment done. Meds as scheduled and prn. Dr. Rainey consulted per hospitalist request.  Teaching:All care explained to pt. Pt. Updated on POC. See education flowsheet.   Response: Will continue to monitor.   Problem: Patient/Family Goals  Goal: Patient/Family Long Term Goal  Description: Patient's Long Term Goal: discharge home    Interventions:  - Consult to Hosp  - Nebs  - IV Steroids  - Wean oxygen  - See additional Care Plan goals for specific interventions  Outcome: Progressing  Goal: Patient/Family Short Term Goal  Description: Patient's Short Term Goal: improve sob  5/17 days-wean oxygen. Decreased SOB.    Interventions:   - Consult to Hosp  - Nebs  - IV Steroids  - Wean oxygen   - See additional Care Plan goals for specific interventions  Outcome: Progressing

## 2024-05-17 NOTE — CONSULTS
HISTORY OF PRESENT ILLNESS       Alie Paniagua is a 59 year old female with a history of obesity, ROBBIE, RLS, and exercise induced asthma who presented to the ER with fevers, chills, cough, wheezing, and chest congestion. Multiple family members came down with a viral illness within the past week or so. The patient then caught it and started having fevers, cough, dyspnea and wheezing about 2-3 days ago. She started using her albuterol inhaler but it did not help. She came to the ER and was started on IV steroids and nebulized bronchodilators. She was admitted and we've been consulted to assist in her care.     The patient states that her pcp diagnosed her with exercise induced asthma since she started having dyspnea with exercises such as running. She has an albuterol inhaler that she uses just prior to exercise. She's never been on a controller medication. She has not been admitted with an asthma exacerbation in the past. She never smoked.     PAST MEDICAL HISTORY     Past Medical History:    COVID    Humerus fracture    Melanoma (HCC)    melanoma - right middle finger    Obesity    ROBBIE (obstructive sleep apnea)    RLS (restless legs syndrome)       PAST SURGICAL HISTORY      Past Surgical History:   Procedure Laterality Date    Carpal tunnel release Right 2022    Carpal tunnel release Left 2022    Colonoscopy  02/2017     diverticulosis, hemorrhoids, polyp (7 mm serrated polyp). repeat 5 years       Colonoscopy N/A 02/13/2017    Procedure: COLONOSCOPY, POSSIBLE BIOPSY, POSSIBLE POLYPECTOMY 94427;  Surgeon: Gatito Wylie MD;  Location: [facility], [facility]    Ct heart w/ calcium scoring  1/24/14 - [facility]    score 0    Other surgical history      sentinel node biopsy    Other surgical history      benign breast lump    Other surgical history      many mole removals    Plantar fascia release Right     Gastrocnemius recession       HOME MEDICATIONS      Prior to Admission Medications   Prescriptions Last  Dose Informant Patient Reported? Taking?   Ascorbic Acid (VITAMIN C) 500 MG Oral Tab 2024 at 0900  Yes Yes   Sig: Take by mouth.   Cholecalciferol (VITAMIN D) 50 MCG (2000 UT) Oral Cap 2024 at 0900  No Yes   Sig: Take 1 capsule (2,000 Units total) by mouth daily.   ELDERBERRY OR   Yes No   Sig: Take 400 mg by mouth.   Fluticasone Propionate (FLONASE) 50 MCG/ACT Nasal Suspension 2024 at 0900  No Yes   Si sprays by Nasal route daily.   Multiple Vitamins-Minerals (MULTI VITAMIN/MINERALS OR) 2024 at 0900  Yes Yes   Sig: Take by mouth.   Omega-3 Fatty Acids (FISH OIL) 1000 MG Oral Cap 2024 at 0900  Yes Yes   Sig: Take by mouth.   ZEPBOUND 2.5 MG/0.5ML Subcutaneous Solution Auto-injector 2024 at 1700  Yes Yes   Sig: Inject 2.5 mg into the skin once a week.   albuterol (VENTOLIN HFA) 108 (90 Base) MCG/ACT Inhalation Aero Soln Past Week  No Yes   Sig: INHALE 2 PUFFS INTO THE LUNGS EVERY 6 (SIX) HOURS AS NEEDED FOR WHEEZING OR SHORTNESS OF BREATH.   buPROPion ER (WELLBUTRIN XL) 300 MG Oral Tablet 24 Hr 2024 at 0900  No Yes   Sig: Take 1 tablet (300 mg total) by mouth daily with breakfast.   gabapentin 300 MG Oral Cap 2024 at 0030  Yes Yes   Sig: Take 1 capsule (300 mg total) by mouth 3 (three) times daily.   polyethylene glycol, PEG 3350, 17 g Oral Powd Pack   No No   Sig: Take 17 g by mouth daily as needed.   propranolol 20 MG Oral Tab 2024 at 0030  No Yes   Sig: Take 1 tablet (20 mg total) by mouth 3 (three) times daily.      Facility-Administered Medications: None       CURRENT MEDICATIONS       methylPREDNISolone  40 mg Intravenous Q8H    heparin  5,000 Units Subcutaneous Q8H DAVID    ipratropium-albuterol  3 mL Nebulization 6 times per day       PRN meds:    ipratropium-albuterol    acetaminophen    polyethylene glycol (PEG 3350)    sennosides    bisacodyl    fleet enema    ondansetron    prochlorperazine    Infusions:        ALLERGIES      Allergies   Allergen Reactions     Codeine NAUSEA AND VOMITING       SOCIAL HISTORY        Social History     Socioeconomic History    Marital status:      Spouse name: Not on file    Number of children: Not on file    Years of education: Not on file    Highest education level: Not on file   Occupational History    Occupation: homemaker   Tobacco Use    Smoking status: Never    Smokeless tobacco: Never   Vaping Use    Vaping status: Never Used   Substance and Sexual Activity    Alcohol use: Not Currently     Comment: once a month    Drug use: No    Sexual activity: Not on file   Other Topics Concern     Service Not Asked    Blood Transfusions Not Asked    Caffeine Concern Not Asked    Occupational Exposure Not Asked    Hobby Hazards Not Asked    Sleep Concern Not Asked    Stress Concern Not Asked    Weight Concern Not Asked    Special Diet Not Asked    Back Care Not Asked    Exercise Not Asked    Bike Helmet Not Asked    Seat Belt Yes    Self-Exams Not Asked   Social History Narrative    .  Homemaker.  Non-smoker.  No alcohol use.     Social Determinants of Health     Financial Resource Strain: Not on file   Food Insecurity: No Food Insecurity (5/17/2024)    Food Insecurity     Food Insecurity: Never true   Transportation Needs: No Transportation Needs (5/17/2024)    Transportation Needs     Lack of Transportation: No     Car Seat: Not on file   Physical Activity: Not on file   Stress: Not on file   Social Connections: Not on file   Housing Stability: Low Risk  (5/17/2024)    Housing Stability     Housing Instability: No     Housing Instability Emergency: Not on file     Crib or Bassinette: Not on file       FAMILY HISTORY      Family History   Problem Relation Age of Onset    Depression Mother         For a long time after pt's father passed away    Diabetes Father         Type 2    High Blood Pressure Brother     Lipids Brother     Diabetes Maternal Grandmother         Type 2    Stroke Maternal Grandfather         at 97 y/o     Diabetes Paternal Grandmother         Type 2    Heart Disorder Paternal Grandfather         May have had a heart issue    No Known Problems Daughter     No Known Problems Daughter     ADHD Son     Depression Son         May have depression    Melanoma Cousin     Arthritis Cousin     Other (Lupus) Maternal Cousin Female     Bipolar Disorder Neg     Schizophrenia Neg     Substance Abuse Neg     Suicide History Neg        REVIEW OF SYSTEMS      10 pt ROS negative except what is mentioned in HPI    OBJECTIVE      O2: 3LPM    Gen - Alert, oriented x 3, in no apparent distress  HEENT - head normocephalic, atraumatic. Eyes-no scleral icterus or injection  Lungs - + expiratory wheezing; exam difficult due to frequent coughing  CV - regular rate & rhythm. Normal S1, S2. No murmurs, rubs, or gallops appreciated.  Abdomen - soft, nontender to palpation. No organomegaly appreciated.   Extremities - No cyanosis, clubbing, edema appreciated.      Labs:    Recent Labs   Lab 05/16/24 2033 05/17/24  0720   WBC 6.1 5.8   HGB 14.1 14.0   .0 279.0     Recent Labs   Lab 05/16/24 2033 05/17/24  0720    136   K 3.8 4.1    106   CO2 28.0 22.0   BUN 20 19   CREATSERUM 1.13* 1.10*   * 162*   ANIONGAP 3 8   ALB 4.0  --    CA 9.1 9.1   MG  --  2.3   ALKPHO 60  --    AST 21  --    ALT 27  --    BILT 0.4  --    TP 7.9  --        Recent Labs   Lab 05/16/24 2033   COVID19 Not Detected   INFAPCR Negative   INFBPCR Negative   RSV Negative       Imaging reviewed.    ASSESSMENT AND PLAN     Dyspnea/cough/wheezing - secondary to acute asthma exacerbation, possibly triggered by viral URI. Her CXR was unremarkable.  -supplemental oxygen prn   -IV steroids - eventually transition to po prednisone taper  -duonebs prn  -needs outpatient pfts  -discussed w/ patient that she may benefit from being on an inhaled corticosteroid at least during times of respiratory infections to help reduce respiratory symptoms and prevent worsening  exacerbations. There are several options including Airsupra (albuterol-budesonide) prn or prn Symbicort or Dulera which can be used prn (so-called SMART therapy). Insurance coverage varies so this would need be investigated.  ROBBIE - on CPAP  -continue CPAP with sleep  Dispo   -full code  -inpatient; possibly home in next day or so      Damion Rainey M.D.  Pulmonary/Critical Care

## 2024-05-17 NOTE — ED QUICK NOTES
Rounding Completed    Pt received nebulizer tx and reports no change in breathing. Dr Montague at bedside to assess.    Provided introduction of self and update on POC.    Bed is locked and in lowest position. Call light within reach.

## 2024-05-18 LAB
ANION GAP SERPL CALC-SCNC: 7 MMOL/L (ref 0–18)
BASOPHILS # BLD AUTO: 0.01 X10(3) UL (ref 0–0.2)
BASOPHILS NFR BLD AUTO: 0.1 %
BUN BLD-MCNC: 25 MG/DL (ref 9–23)
CALCIUM BLD-MCNC: 9 MG/DL (ref 8.5–10.1)
CHLORIDE SERPL-SCNC: 108 MMOL/L (ref 98–112)
CO2 SERPL-SCNC: 22 MMOL/L (ref 21–32)
CREAT BLD-MCNC: 1.02 MG/DL
EGFRCR SERPLBLD CKD-EPI 2021: 64 ML/MIN/1.73M2 (ref 60–?)
EOSINOPHIL # BLD AUTO: 0 X10(3) UL (ref 0–0.7)
EOSINOPHIL NFR BLD AUTO: 0 %
ERYTHROCYTE [DISTWIDTH] IN BLOOD BY AUTOMATED COUNT: 13.2 %
GLUCOSE BLD-MCNC: 141 MG/DL (ref 70–99)
HCT VFR BLD AUTO: 41.9 %
HGB BLD-MCNC: 13.5 G/DL
IMM GRANULOCYTES # BLD AUTO: 0.08 X10(3) UL (ref 0–1)
IMM GRANULOCYTES NFR BLD: 0.7 %
LYMPHOCYTES # BLD AUTO: 1.82 X10(3) UL (ref 1–4)
LYMPHOCYTES NFR BLD AUTO: 14.9 %
MAGNESIUM SERPL-MCNC: 2.6 MG/DL (ref 1.6–2.6)
MCH RBC QN AUTO: 29.1 PG (ref 26–34)
MCHC RBC AUTO-ENTMCNC: 32.2 G/DL (ref 31–37)
MCV RBC AUTO: 90.3 FL
MONOCYTES # BLD AUTO: 0.59 X10(3) UL (ref 0.1–1)
MONOCYTES NFR BLD AUTO: 4.8 %
NEUTROPHILS # BLD AUTO: 9.73 X10 (3) UL (ref 1.5–7.7)
NEUTROPHILS # BLD AUTO: 9.73 X10(3) UL (ref 1.5–7.7)
NEUTROPHILS NFR BLD AUTO: 79.5 %
OSMOLALITY SERPL CALC.SUM OF ELEC: 291 MOSM/KG (ref 275–295)
PLATELET # BLD AUTO: 299 10(3)UL (ref 150–450)
POTASSIUM SERPL-SCNC: 4.3 MMOL/L (ref 3.5–5.1)
RBC # BLD AUTO: 4.64 X10(6)UL
SODIUM SERPL-SCNC: 137 MMOL/L (ref 136–145)
WBC # BLD AUTO: 12.2 X10(3) UL (ref 4–11)

## 2024-05-18 PROCEDURE — 94640 AIRWAY INHALATION TREATMENT: CPT

## 2024-05-18 PROCEDURE — 94799 UNLISTED PULMONARY SVC/PX: CPT

## 2024-05-18 PROCEDURE — 80048 BASIC METABOLIC PNL TOTAL CA: CPT | Performed by: INTERNAL MEDICINE

## 2024-05-18 PROCEDURE — 85025 COMPLETE CBC W/AUTO DIFF WBC: CPT | Performed by: INTERNAL MEDICINE

## 2024-05-18 PROCEDURE — 83735 ASSAY OF MAGNESIUM: CPT | Performed by: INTERNAL MEDICINE

## 2024-05-18 RX ORDER — GUAIFENESIN 600 MG/1
600 TABLET, EXTENDED RELEASE ORAL 2 TIMES DAILY
Status: DISCONTINUED | OUTPATIENT
Start: 2024-05-18 | End: 2024-05-19

## 2024-05-18 RX ORDER — FLUTICASONE FUROATE AND VILANTEROL 100; 25 UG/1; UG/1
1 POWDER RESPIRATORY (INHALATION)
Status: DISCONTINUED | OUTPATIENT
Start: 2024-05-18 | End: 2024-05-19

## 2024-05-18 NOTE — PROGRESS NOTES
[facility]  Hospitalist Progress Note                                                                     [facility]   part of [facility]        Alie Paniagua  3/21/1965    SUBJECTIVE:  Patient seen and examined.  Dropping to 88% on RA during ambulation.  Denies CP.  SOB improving.  NAD.       OBJECTIVE:  Temp:  [97.8 °F (36.6 °C)-98.3 °F (36.8 °C)] 98.3 °F (36.8 °C)  Pulse:  [90-94] 91  Resp:  [18] 18  BP: (138-156)/(58-83) 138/58  SpO2:  [92 %-96 %] 94 %  Exam  Gen: No acute distress, alert and oriented x3, no focal neurologic deficits  Pulm: Lungs clear bilaterally, normal respiratory effort  CV: Heart with regular rate and rhythm, no murmur.  Normal PMI.    Abd: Abdomen soft, nontender, nondistended, no organomegaly, bowel sounds present  MSK: Full range of motion in extremities, no clubbing, no cyanosis  Skin: no rashes or lesions    Labs:   Recent Labs   Lab 05/16/24 2033 05/17/24  0720 05/18/24  0854   WBC 6.1 5.8 12.2*   HGB 14.1 14.0 13.5   MCV 86.0 89.3 90.3   .0 279.0 299.0       Recent Labs   Lab 05/16/24 2033 05/17/24  0720 05/18/24  0854    136 137   K 3.8 4.1 4.3    106 108   CO2 28.0 22.0 22.0   BUN 20 19 25*   CREATSERUM 1.13* 1.10* 1.02   CA 9.1 9.1 9.0   MG  --  2.3 2.6   * 162* 141*       Recent Labs   Lab 05/16/24 2033   ALT 27   AST 21   ALB 4.0       No results for input(s): \"PGLU\" in the last 168 hours.    Meds:   Scheduled:    fluticasone furoate-vilanterol  1 puff Inhalation Daily    methylPREDNISolone  40 mg Intravenous Q8H    heparin  5,000 Units Subcutaneous Q8H DAVID    ipratropium-albuterol  3 mL Nebulization 6 times per day     Continuous Infusions:   PRN:   ipratropium-albuterol    acetaminophen    polyethylene glycol (PEG 3350)    sennosides    bisacodyl    fleet enema    ondansetron    prochlorperazine    Assessment/Plan:  Principal Problem:    Severe persistent asthma with  exacerbation (HCC)  Active Problems:    Hyperglycemia    59 year old female with PMH sig for exercise induced asthma, obesity/ROBBIE p/w SOB and wheezing.     Acute hypoxic respiratory failure  Acute asthma exacerbation triggered by viral URI  - IV steroids  - scheduled nebuilzers  - consult pulm - may need long acting inhaled corticosteroid  - wean O2 as tolerated  - viral symptoms resolved, viral panel would be academic and wouldn't change course of treatment  - shourt course of breo per pulm      Obesity/ROBBIE  - cpap qhs     FEN: regular diet, PT/OT  Proph: SCDs, lovenox  Code status: Full code     Dispo - dc once weaned off O2     Venancio Jorge MD  [facility] Hospitalist  Message over [email or web address]  Pager: [phone]

## 2024-05-18 NOTE — PLAN OF CARE
Problem:SOB/URI/Asthma  Data:Pt. A/O x4. VSS. Denies pain. O2 sats hovering in the low 90's this am on 2L O2. Dangelo. Lungs with scattered exp. Wheezes and few crackles to bases. Occassional congested, non-productive cough noted. Pt. Reports some SOB with activity. Pt. Up and ambulating in halls. See O2 note.  Action: Monitor VS. Assessment done. Meds as scheduled and prn.  Teaching:All care explained to pt. Pt. Updated on POC. See education flowsheet.  Response: Will continue to monitor.      Problem: Patient/Family Goals  Goal: Patient/Family Long Term Goal  Description: Patient's Long Term Goal: discharge home    Interventions:  - Consult to Hosp  - Nebs  - IV Steroids  - Wean oxygen  - See additional Care Plan goals for specific interventions  Outcome: Progressing  Goal: Patient/Family Short Term Goal  Description: Patient's Short Term Goal: improve sob  5/17 days-wean oxygen. Decreased SOB.  5/18 days-wean oxygen. Decreased cough and SOB.    Interventions:   - Consult to Hosp  - Nebs  - IV Steroids  - Wean oxygen   - See additional Care Plan goals for specific interventions  Outcome: Progressing

## 2024-05-18 NOTE — PROGRESS NOTES
Received patient alert and orientate. Oxygen WNL on 3L NC, , unable to wean patient this shift. No complaints of pain, medications given per MAR. IV SL. Tolerating diet. Voiding. Up ambulating the halls this shift. Rounded on frequently, updated on POC. WCTM

## 2024-05-18 NOTE — PROGRESS NOTES
SPO2% ON ROOM AIR AT REST 90%  SPO2% AMBULATION ON ROOM AIR 87% able to recover when  she stops and rests for a few minutes up to 90%.

## 2024-05-18 NOTE — PROGRESS NOTES
[facility]    Alie Paniagua Patient Status:  Observation    3/21/1965 MRN [id number]   Location [facility] 5NW-A Attending Toyin Jorge*   Hosp Day # 0 PCP Sunshine Lance MD     SUBJECTIVE: Pt states that breathing is improving, still getting winded with activity, had desaturated on morning walk.  She state that cough persists with deep breaths    OBJECTIVE:  /58 (BP Location: Right arm)   Pulse 91   Temp 98.3 °F (36.8 °C) (Oral)   Resp 18   Ht 5' 7\" (1.702 m)   Wt 242 lb 6.4 oz (110 kg)   LMP  (LMP Unknown)   SpO2 94%   BMI 37.97 kg/m²   O2 requirement: RA     I/O last 3 completed shifts:  In: -   Out: 2 [Urine:2]  No intake/output data recorded.     Current Medications:   Current Facility-Administered Medications:     ipratropium-albuterol (Duoneb) 0.5-2.5 (3) MG/3ML inhalation solution 3 mL, 3 mL, Nebulization, Q4H PRN    methylPREDNISolone sodium succinate (Solu-MEDROL) injection 40 mg, 40 mg, Intravenous, Q8H    heparin (Porcine) 5000 UNIT/ML injection 5,000 Units, 5,000 Units, Subcutaneous, Q8H DAVID    acetaminophen (Tylenol Extra Strength) tab 500 mg, 500 mg, Oral, Q4H PRN    polyethylene glycol (PEG 3350) (Miralax) 17 g oral packet 17 g, 17 g, Oral, Daily PRN    sennosides (Senokot) tab 17.2 mg, 17.2 mg, Oral, Nightly PRN    bisacodyl (Dulcolax) 10 MG rectal suppository 10 mg, 10 mg, Rectal, Daily PRN    fleet enema (Fleet) 7-19 GM/118ML rectal enema 133 mL, 1 enema, Rectal, Once PRN    ondansetron (Zofran) 4 MG/2ML injection 4 mg, 4 mg, Intravenous, Q6H PRN    prochlorperazine (Compazine) 10 MG/2ML injection 5 mg, 5 mg, Intravenous, Q8H PRN    ipratropium-albuterol (Duoneb) 0.5-2.5 (3) MG/3ML inhalation solution 3 mL, 3 mL, Nebulization, 6 times per day      Physical Exam:                          General: alert, cooperative, in NAD                          HEENT: oropharynx clear without erythema or exudates, moist mucous membranes                          Lungs:  expiratory wheeze                          Chest wall: No tenderness or deformity.                          Heart: Regular rate and rhythm, normal S1S2                          Abdomen: soft, non-tender, non-distended, positive BS.                          Extremity: No clubbing or cyanosis. no edema                          Skin: No rashes or lesions.       Lab Results   Component Value Date    WBC 12.2 05/18/2024    RBC 4.64 05/18/2024    HGB 13.5 05/18/2024    HCT 41.9 05/18/2024    MCV 90.3 05/18/2024    MCH 29.1 05/18/2024    MCHC 32.2 05/18/2024    RDW 13.2 05/18/2024    .0 05/18/2024     Lab Results   Component Value Date     05/18/2024    K 4.3 05/18/2024     05/18/2024    CO2 22.0 05/18/2024    BUN 25 05/18/2024    CREATSERUM 1.02 05/18/2024     05/18/2024    CA 9.0 05/18/2024     No results found for: \"PT\", \"INR\"       Imaging: I have independently visualized all relevant chest imaging in PACS.  I agree with the radiology interpretation except where noted.       ASSESSMENT/PLAN:  Dyspnea/cough/wheezing - secondary to acute asthma exacerbation, possibly triggered by viral URI. Her CXR was unremarkable.  -covid/flu/RSV negative  -wean O2 as able  -IV steroids - eventually transition to po prednisone taper  -start breo - plan for minimum of short term therapy   -duonebs prn  -needs outpatient pfts  -discussed w/ patient that she may benefit from being on an inhaled corticosteroid at least during times of respiratory infections to help reduce respiratory symptoms and prevent worsening exacerbations. There are several options including Airsupra (albuterol-budesonide) prn or prn Symbicort or Dulera which can be used prn (so-called SMART therapy)  ROBBIE - on CPAP  -continue CPAP with sleep  Dispo   -full code  -inpatient, anticipate discharge tomorrow if continues to improve    Cristina Mccauley MD  5/18/2024  12:16 PM

## 2024-05-19 VITALS
DIASTOLIC BLOOD PRESSURE: 64 MMHG | OXYGEN SATURATION: 93 % | TEMPERATURE: 98 F | SYSTOLIC BLOOD PRESSURE: 144 MMHG | BODY MASS INDEX: 38.04 KG/M2 | WEIGHT: 242.38 LBS | RESPIRATION RATE: 16 BRPM | HEART RATE: 87 BPM | HEIGHT: 67 IN

## 2024-05-19 LAB
ANION GAP SERPL CALC-SCNC: 7 MMOL/L (ref 0–18)
BASOPHILS # BLD AUTO: 0.02 X10(3) UL (ref 0–0.2)
BASOPHILS NFR BLD AUTO: 0.1 %
BUN BLD-MCNC: 20 MG/DL (ref 9–23)
CALCIUM BLD-MCNC: 8.8 MG/DL (ref 8.5–10.1)
CHLORIDE SERPL-SCNC: 107 MMOL/L (ref 98–112)
CO2 SERPL-SCNC: 24 MMOL/L (ref 21–32)
CREAT BLD-MCNC: 0.93 MG/DL
EGFRCR SERPLBLD CKD-EPI 2021: 71 ML/MIN/1.73M2 (ref 60–?)
EOSINOPHIL # BLD AUTO: 0.01 X10(3) UL (ref 0–0.7)
EOSINOPHIL NFR BLD AUTO: 0.1 %
ERYTHROCYTE [DISTWIDTH] IN BLOOD BY AUTOMATED COUNT: 13.2 %
GLUCOSE BLD-MCNC: 114 MG/DL (ref 70–99)
HCT VFR BLD AUTO: 41.6 %
HGB BLD-MCNC: 13.1 G/DL
IMM GRANULOCYTES # BLD AUTO: 0.08 X10(3) UL (ref 0–1)
IMM GRANULOCYTES NFR BLD: 0.6 %
LYMPHOCYTES # BLD AUTO: 2.45 X10(3) UL (ref 1–4)
LYMPHOCYTES NFR BLD AUTO: 18.3 %
MAGNESIUM SERPL-MCNC: 2.4 MG/DL (ref 1.6–2.6)
MCH RBC QN AUTO: 29.4 PG (ref 26–34)
MCHC RBC AUTO-ENTMCNC: 31.5 G/DL (ref 31–37)
MCV RBC AUTO: 93.5 FL
MONOCYTES # BLD AUTO: 0.79 X10(3) UL (ref 0.1–1)
MONOCYTES NFR BLD AUTO: 5.9 %
NEUTROPHILS # BLD AUTO: 10.07 X10 (3) UL (ref 1.5–7.7)
NEUTROPHILS # BLD AUTO: 10.07 X10(3) UL (ref 1.5–7.7)
NEUTROPHILS NFR BLD AUTO: 75 %
OSMOLALITY SERPL CALC.SUM OF ELEC: 289 MOSM/KG (ref 275–295)
PLATELET # BLD AUTO: 278 10(3)UL (ref 150–450)
POTASSIUM SERPL-SCNC: 4.3 MMOL/L (ref 3.5–5.1)
RBC # BLD AUTO: 4.45 X10(6)UL
SODIUM SERPL-SCNC: 138 MMOL/L (ref 136–145)
WBC # BLD AUTO: 13.4 X10(3) UL (ref 4–11)

## 2024-05-19 PROCEDURE — 80048 BASIC METABOLIC PNL TOTAL CA: CPT | Performed by: INTERNAL MEDICINE

## 2024-05-19 PROCEDURE — 94640 AIRWAY INHALATION TREATMENT: CPT

## 2024-05-19 PROCEDURE — 83735 ASSAY OF MAGNESIUM: CPT | Performed by: INTERNAL MEDICINE

## 2024-05-19 PROCEDURE — 85025 COMPLETE CBC W/AUTO DIFF WBC: CPT | Performed by: INTERNAL MEDICINE

## 2024-05-19 PROCEDURE — 94799 UNLISTED PULMONARY SVC/PX: CPT

## 2024-05-19 RX ORDER — PREDNISONE 20 MG/1
TABLET ORAL
Qty: 15 TABLET | Refills: 0 | Status: SHIPPED | OUTPATIENT
Start: 2024-05-19

## 2024-05-19 NOTE — PLAN OF CARE
Problem: Patient/Family Goals  Goal: Patient/Family Short Term Goal  Description: Patient's Short Term Goal: improve sob  5/17 days-wean oxygen. Decreased SOB.  5/18 days-wean oxygen. Decreased cough and SOB.  5/18 nocs: maintain oxygen saturation on room air  Interventions:   - Consult to Hosp  - Nebs  - IV Steroids  - Wean oxygen   - See additional Care Plan goals for specific interventions  Outcome: Progressing     Problem: PAIN - ADULT  Goal: Verbalizes/displays adequate comfort level or patient's stated pain goal  Description: INTERVENTIONS:  - Encourage pt to monitor pain and request assistance  - Assess pain using appropriate pain scale  - Administer analgesics based on type and severity of pain and evaluate response  - Implement non-pharmacological measures as appropriate and evaluate response  - Consider cultural and social influences on pain and pain management  - Manage/alleviate anxiety  - Utilize distraction and/or relaxation techniques  - Monitor for opioid side effects  - Notify MD/LIP if interventions unsuccessful or patient reports new pain  - Anticipate increased pain with activity and pre-medicate as appropriate  Outcome: Progressing     Problem: RISK FOR INFECTION - ADULT  Goal: Absence of fever/infection during anticipated neutropenic period  Description: INTERVENTIONS  - Monitor WBC  - Administer growth factors as ordered  - Implement neutropenic guidelines  Outcome: Progressing     Problem: SAFETY ADULT - FALL  Goal: Free from fall injury  Description: INTERVENTIONS:  - Assess pt frequently for physical needs  - Identify cognitive and physical deficits and behaviors that affect risk of falls.  - [place] fall precautions as indicated by assessment.  - Educate pt/family on patient safety including physical limitations  - Instruct pt to call for assistance with activity based on assessment  - Modify environment to reduce risk of injury  - Provide assistive devices as appropriate  - Consider  OT/PT consult to assist with strengthening/mobility  - Encourage toileting schedule  Outcome: Progressing     Problem: DISCHARGE PLANNING  Goal: Discharge to home or other facility with appropriate resources  Description: INTERVENTIONS:  - Identify barriers to discharge w/pt and caregiver  - Include patient/family/discharge partner in discharge planning  - Arrange for needed discharge resources and transportation as appropriate  - Identify discharge learning needs (meds, wound care, etc)  - Arrange for interpreters to assist at discharge as needed  - Consider post-discharge preferences of patient/family/discharge partner  - Complete POLST form as appropriate  - Assess patient's ability to be responsible for managing their own health  - Refer to Case Management Department for coordinating discharge planning if the patient needs post-hospital services based on physician/LIP order or complex needs related to functional status, cognitive ability or social support system  Outcome: Progressing     Problem: RESPIRATORY - ADULT  Goal: Achieves optimal ventilation and oxygenation  Description: INTERVENTIONS:  - Assess for changes in respiratory status  - Assess for changes in mentation and behavior  - Position to facilitate oxygenation and minimize respiratory effort  - Oxygen supplementation based on oxygen saturation or ABGs  - Provide Smoking Cessation handout, if applicable  - Encourage broncho-pulmonary hygiene including cough, deep breathe, Incentive Spirometry  - Assess the need for suctioning and perform as needed  - Assess and instruct to report SOB or any respiratory difficulty  - Respiratory Therapy support as indicated  - Manage/alleviate anxiety  - Monitor for signs/symptoms of CO2 retention  Outcome: Progressing       Patient received tonight alert and oriented x4, vss, denies pain.  She was on room air with pulse ox around 93%, expiratory wheezes noted bilaterally.  She went for a walk tonight on room air and  stayed at 90-92%, denies sob.  She admits to a non  productive cough.  Pt requesting mucinex, Dr Nicanor zavala, waiting for further orders.  Poc discussed, she verbalizes understanding.  Safety and comfort measures provided, will monitor.

## 2024-05-19 NOTE — PROGRESS NOTES
NURSING DISCHARGE NOTE    Discharged Home via Ambulatory.  Accompanied by Family member  Belongings Taken by patient/family.    PIV removed. Discharge education reviewed with pt and spouse. Medications reviewed. Updated on follow ups. No further questions on POC at this time.     Problem: Patient/Family Goals  Goal: Patient/Family Long Term Goal  Description: Patient's Long Term Goal: discharge home    Interventions:  - Consult to Hosp  - Nebs  - IV Steroids  - Wean oxygen  - See additional Care Plan goals for specific interventions  Outcome: Progressing  Goal: Patient/Family Short Term Goal  Description: Patient's Short Term Goal: improve sob  5/17 days-wean oxygen. Decreased SOB.  5/18 days-wean oxygen. Decreased cough and SOB.  5/18 nocs: maintain oxygen saturation on room air\  5/19am: discharge home   Interventions:   - Consult to Hosp  - Nebs  - IV Steroids  - Wean oxygen   - See additional Care Plan goals for specific interventions  Outcome: Progressing     Problem: PAIN - ADULT  Goal: Verbalizes/displays adequate comfort level or patient's stated pain goal  Description: INTERVENTIONS:  - Encourage pt to monitor pain and request assistance  - Assess pain using appropriate pain scale  - Administer analgesics based on type and severity of pain and evaluate response  - Implement non-pharmacological measures as appropriate and evaluate response  - Consider cultural and social influences on pain and pain management  - Manage/alleviate anxiety  - Utilize distraction and/or relaxation techniques  - Monitor for opioid side effects  - Notify MD/LIP if interventions unsuccessful or patient reports new pain  - Anticipate increased pain with activity and pre-medicate as appropriate  Outcome: Progressing     Problem: RISK FOR INFECTION - ADULT  Goal: Absence of fever/infection during anticipated neutropenic period  Description: INTERVENTIONS  - Monitor WBC  - Administer growth factors as ordered  - Implement neutropenic  guidelines  Outcome: Progressing     Problem: SAFETY ADULT - FALL  Goal: Free from fall injury  Description: INTERVENTIONS:  - Assess pt frequently for physical needs  - Identify cognitive and physical deficits and behaviors that affect risk of falls.  - [place] fall precautions as indicated by assessment.  - Educate pt/family on patient safety including physical limitations  - Instruct pt to call for assistance with activity based on assessment  - Modify environment to reduce risk of injury  - Provide assistive devices as appropriate  - Consider OT/PT consult to assist with strengthening/mobility  - Encourage toileting schedule  Outcome: Progressing     Problem: DISCHARGE PLANNING  Goal: Discharge to home or other facility with appropriate resources  Description: INTERVENTIONS:  - Identify barriers to discharge w/pt and caregiver  - Include patient/family/discharge partner in discharge planning  - Arrange for needed discharge resources and transportation as appropriate  - Identify discharge learning needs (meds, wound care, etc)  - Arrange for interpreters to assist at discharge as needed  - Consider post-discharge preferences of patient/family/discharge partner  - Complete POLST form as appropriate  - Assess patient's ability to be responsible for managing their own health  - Refer to Case Management Department for coordinating discharge planning if the patient needs post-hospital services based on physician/LIP order or complex needs related to functional status, cognitive ability or social support system  Outcome: Progressing     Problem: RESPIRATORY - ADULT  Goal: Achieves optimal ventilation and oxygenation  Description: INTERVENTIONS:  - Assess for changes in respiratory status  - Assess for changes in mentation and behavior  - Position to facilitate oxygenation and minimize respiratory effort  - Oxygen supplementation based on oxygen saturation or ABGs  - Provide Smoking Cessation handout, if applicable  -  Encourage broncho-pulmonary hygiene including cough, deep breathe, Incentive Spirometry  - Assess the need for suctioning and perform as needed  - Assess and instruct to report SOB or any respiratory difficulty  - Respiratory Therapy support as indicated  - Manage/alleviate anxiety  - Monitor for signs/symptoms of CO2 retention  Outcome: Progressing

## 2024-05-19 NOTE — PROGRESS NOTES
[facility]    Alie Paniagua Patient Status:  Observation    3/21/1965 MRN [id number]   Location [facility] 5NW-A Attending Toyin Jorge*   Hosp Day # 1 PCP Sunshine Lance MD     SUBJECTIVE:  Pt states that she is feeling improved, still with some cough.    OBJECTIVE:  /64 (BP Location: Right arm)   Pulse 77   Temp 97.9 °F (36.6 °C) (Oral)   Resp 18   Ht 5' 7\" (1.702 m)   Wt 242 lb 6.4 oz (110 kg)   LMP  (LMP Unknown)   SpO2 93%   BMI 37.97 kg/m²   O2 requirement: RA     No intake/output data recorded.  No intake/output data recorded.     Current Medications:   Current Facility-Administered Medications:     fluticasone furoate-vilanterol (Breo Ellipta) 100-25 MCG/ACT inhaler 1 puff, 1 puff, Inhalation, Daily    guaiFENesin ER (Mucinex) 12 hr tab 600 mg, 600 mg, Oral, BID    ipratropium-albuterol (Duoneb) 0.5-2.5 (3) MG/3ML inhalation solution 3 mL, 3 mL, Nebulization, Q4H PRN    methylPREDNISolone sodium succinate (Solu-MEDROL) injection 40 mg, 40 mg, Intravenous, Q8H    heparin (Porcine) 5000 UNIT/ML injection 5,000 Units, 5,000 Units, Subcutaneous, Q8H DAVID    acetaminophen (Tylenol Extra Strength) tab 500 mg, 500 mg, Oral, Q4H PRN    polyethylene glycol (PEG 3350) (Miralax) 17 g oral packet 17 g, 17 g, Oral, Daily PRN    sennosides (Senokot) tab 17.2 mg, 17.2 mg, Oral, Nightly PRN    bisacodyl (Dulcolax) 10 MG rectal suppository 10 mg, 10 mg, Rectal, Daily PRN    fleet enema (Fleet) 7-19 GM/118ML rectal enema 133 mL, 1 enema, Rectal, Once PRN    ondansetron (Zofran) 4 MG/2ML injection 4 mg, 4 mg, Intravenous, Q6H PRN    prochlorperazine (Compazine) 10 MG/2ML injection 5 mg, 5 mg, Intravenous, Q8H PRN    ipratropium-albuterol (Duoneb) 0.5-2.5 (3) MG/3ML inhalation solution 3 mL, 3 mL, Nebulization, 6 times per day      Physical Exam:                          General: alert, cooperative, in NAD                          HEENT: oropharynx clear without erythema or exudates,  moist mucous membranes                          Lungs: expiratory wheeze                          Chest wall: No tenderness or deformity.                          Heart: Regular rate and rhythm, normal S1S2                          Abdomen: soft, non-tender, non-distended, positive BS.                          Extremity: No clubbing or cyanosis. no edema                          Skin: No rashes or lesions.       Lab Results   Component Value Date    WBC 13.4 05/19/2024    RBC 4.45 05/19/2024    HGB 13.1 05/19/2024    HCT 41.6 05/19/2024    MCV 93.5 05/19/2024    MCH 29.4 05/19/2024    MCHC 31.5 05/19/2024    RDW 13.2 05/19/2024    .0 05/19/2024     Lab Results   Component Value Date     05/19/2024    K 4.3 05/19/2024     05/19/2024    CO2 24.0 05/19/2024    BUN 20 05/19/2024    CREATSERUM 0.93 05/19/2024     05/19/2024    CA 8.8 05/19/2024     No results found for: \"PT\", \"INR\"       Imaging: I have independently visualized all relevant chest imaging in PACS.  I agree with the radiology interpretation except where noted.       ASSESSMENT/PLAN:  Dyspnea/cough/wheezing - secondary to acute asthma exacerbation, possibly triggered by viral URI. Her CXR was unremarkable.  -covid/flu/RSV negative  -wean O2 as able  -transition to PO prednisone today, plan for taper on discharge  -start breo - plan for minimum of short term therapy   -duonebs prn  -needs outpatient pfts  -discussed w/ patient that she may benefit from being on an inhaled corticosteroid at least during times of respiratory infections to help reduce respiratory symptoms and prevent worsening exacerbations. There are several options including Airsupra (albuterol-budesonide) prn or prn Symbicort or Dulera which can be used prn (so-called SMART therapy)  ROBBIE - on CPAP  -continue CPAP with sleep  Dispo   -full code  -stable from respiratory standpoint for discharge   -pt to follow up with pulgabriela MANNING or Dr. Nation in 2 weeks     Cristina  Cheri Mccauley MD

## 2024-05-19 NOTE — DISCHARGE SUMMARY
[facility] Hospitalist Discharge Summary     Patient ID:  Alie Paniagua  59 year old  3/21/1965    Admit date: 5/16/2024    Discharge date and time: 05/19/24     Attending Physician: Toyin Jorge*     Primary Care Physician: Sunshine Lance MD     Discharge Diagnoses: Severe persistent asthma with exacerbation (HCC) [J45.51]    Please note that only IHP DMG and EMG patients enrolled in the Medicare ACO, [facility] ACO and [facility] HMOs will be handled by the [facility] Care Management team.  For all other patients, please follow usual protocol for discharge care transition.    Discharge Condition: stable    Disposition:  home    Important Follow up:  - PCP within 2 weeks              Hospital Course:        59 year old female with PMH sig for exercise induced asthma, obesity/ROBBIE p/w SOB and wheezing. Reported viral URI symptoms earlier this week that went through her family. She started feeling better but then she started getting SOB and winded very quickly. Feeling SHEPPARD and was getting fatigued.  In the ED she was hyeprtensive and saturating low 90s on RA. Started on iv steroids, nebulizers and admitted for asthma exacerbation.     Acute hypoxic respiratory failure - resolved  Acute asthma exacerbation triggered by viral URI  - IV steroids - transition to prednisone taper on dc   - scheduled nebuilzers  - consult pulm - may need long acting inhaled corticosteroid - dc with breo   - wean O2 as tolerated  - viral symptoms resolved, viral panel would be academic and wouldn't change course of treatment  - shourt course of breo per pulm      Obesity/ROBBIE  - cpap qhs    Consults: IP CONSULT TO HOSPITALIST  IP CONSULT TO PULMONOLOGY    Operative Procedures:        Patient instructions:      I as the attending physician reconciled the current and discharge medications on day of discharge.     Current Discharge Medication List        CONTINUE these medications which  have NOT CHANGED    Details   buPROPion ER (WELLBUTRIN XL) 300 MG Oral Tablet 24 Hr Take 1 tablet (300 mg total) by mouth daily with breakfast.      propranolol 20 MG Oral Tab Take 1 tablet (20 mg total) by mouth 3 (three) times daily.      gabapentin 300 MG Oral Cap Take 1 capsule (300 mg total) by mouth 3 (three) times daily.      Cholecalciferol (VITAMIN D) 50 MCG (2000 UT) Oral Cap Take 1 capsule (2,000 Units total) by mouth daily.      albuterol (VENTOLIN HFA) 108 (90 Base) MCG/ACT Inhalation Aero Soln INHALE 2 PUFFS INTO THE LUNGS EVERY 6 (SIX) HOURS AS NEEDED FOR WHEEZING OR SHORTNESS OF BREATH.      Fluticasone Propionate (FLONASE) 50 MCG/ACT Nasal Suspension 2 sprays by Nasal route daily.      Ascorbic Acid (VITAMIN C) 500 MG Oral Tab Take by mouth.      Omega-3 Fatty Acids (FISH OIL) 1000 MG Oral Cap Take by mouth.      Multiple Vitamins-Minerals (MULTI VITAMIN/MINERALS OR) Take by mouth.      ELDERBERRY OR Take 400 mg by mouth.           STOP taking these medications       ZEPBOUND 2.5 MG/0.5ML Subcutaneous Solution Auto-injector        polyethylene glycol, PEG 3350, 17 g Oral Powd Pack              Activity: activity as tolerated  Diet: regular diet  Wound Care: as directed  Code Status: Full Code      Discharge Exam:     General: no acute distress, alert and oriented x 3  Heart: RRR  Lungs: clear bilaterally, no active wheezing  Abdomen: nontender, nondistended, intact BS  Extremities: no pedal edema   Neuro: CN inact, no focal deficits      Total time coordinating care for discharge: Greater than 30 minutes    Venancio Jorge MD  [facility]

## 2024-05-24 NOTE — PAYOR COMM NOTE
--------------  ADMISSION REVIEW     Payor: [facility]  Subscriber #:  [id number]  Authorization Number: [id number]    Admit date: 5/18/24  Admit time:  3:11 PM     Admit Orders (From admission, onward)       Start     Ordered    05/18/24 1512  Admit to inpatient Once  Once        Ordering Provider: Toyin Jorge MD   Question:  Diagnosis  Answer:  Severe persistent asthma with exacerbation (HCC)    05/18/24 1511    05/17/24 0146  Place in observation Once  Once        Ordering Provider: Evan Butt MD   Question:  Diagnosis  Answer:  Severe persistent asthma with exacerbation (HCC)    05/17/24 0145                   History   HPI  Patient is a 59-year-old woman who has a history of mild exercise-induced asthma presents with cough congestion and wheezing over the last week.  Started off with fevers and chills.  Says of viruses running through the house.  Was seen at the [facility] immediate care and sent to the ER.   is at bedside.  No history of blood clots.  Does have albuterol at home.  Recent Medrol Dosepak.  Says this is different than any asthma symptoms she has had in the past.  No other specific complaints.    ED Triage Vitals [05/16/24 1959]   /85   Pulse 93   Resp 22   Temp 98.6 °F (37 °C)   Temp src Oral   SpO2 92 %   O2 Device None (Room air)   HEENT: Normal cephalic atraumatic.  Nonicteric sclera.  Moist mucous membranes.  No meningismus.  No adenopathy  Lungs: Expiratory wheezing in all lung fields  Cardiac: Mild tachycardia.  No murmurs.  Regular rate and rhythm.  Abdomen: Soft and nontender throughout.  No rebound or guarding  Extremities: No clubbing/cyanosis/edema.  Skin: No rashes, no pallor  Neuro: Awake oriented ×3.  Nonfocal.  Good strength throughout    Labs Reviewed   COMP METABOLIC PANEL (14) - Abnormal; Notable for the following components:       Result Value    Glucose 109 (*)     Creatinine 1.13 (*)     eGFR-Cr 56 (*)     All other components within normal limits    CBC W/ DIFFERENTIAL - Abnormal; Notable for the following components:    Monocyte Absolute 1.09 (*)    EKG    Rate, intervals and axes as noted on EKG Report.  Rate: 88  Rhythm: Sinus Rhythm  Reading: Normal sinus rhythm.  No acute ST-T wave changes.  Axis/intervals are noted.  Otherwise, agree with EKG report    Disposition and Plan   Clinical Impression:  1. Severe persistent asthma with exacerbation (HCC)         History and Physical        History of Present Illness: Patient is a 59 year old female with PMH sig for exercise induced asthma, obesity/ROBBIE p/w SOB and wheezing. Reported viral URI symptoms earlier this week that went through her family. She started feeling better but then she started getting SOB and winded very quickly. Feeling SHEPPARD and was getting fatigued.  In the ED she was hyeprtensive and saturating low 90s on RA. Started on iv steroids, nebulizers and admitted for asthma exacerbation.     /82 (BP Location: Left arm)   Pulse 93   Temp 98.4 °F (36.9 °C) (Oral)   Resp 18   Ht 5' 7\" (1.702 m)   Wt 242 lb 6.4 oz (110 kg)   LMP  (LMP Unknown)   SpO2 92%   BMI 37.97 kg/m²       Head:  Normocephalic, without obvious abnormality, atraumatic.   Eyes:  Sclera anicteric, No conjunctival pallor, EOMs intact.    Nose: Nares normal. Septum midline. Mucosa normal. No drainage.   Throat: Lips, mucosa, and tongue normal. Teeth and gums normal.   Neck: Supple, symmetrical, trachea midline, no cervical or supraclavicular lymph adenopathy, thyroid: no enlargment/tenderness/nodules appreciated   Lungs:   End expiratory wheezing with poor air movement bilaterally. Normal effort   Chest wall:  No tenderness or deformity.   Heart:  Regular rate and rhythm, S1, S2 normal, no murmur, rub or gallop appreciated   Abdomen:   Soft, non-tender. Bowel sounds normal. No masses,  No organomegaly. Non distended   Extremities: Extremities normal, atraumatic, no cyanosis or edema.   Skin: Skin color, texture, turgor  normal. No rashes or lesions.    Neurologic: Normal strength, no focal deficit appreciated     Lab Results   Component Value Date     WBC 5.8 05/17/2024     HGB 14.0 05/17/2024     HCT 43.4 05/17/2024     .0 05/17/2024     CREATSERUM 1.10 05/17/2024     BUN 19 05/17/2024      05/17/2024     K 4.1 05/17/2024      05/17/2024     CO2 22.0 05/17/2024      05/17/2024     CA 9.1 05/17/2024     ALB 4.0 05/16/2024     ALKPHO 60 05/16/2024     BILT 0.4 05/16/2024     TP 7.9 05/16/2024     AST 21 05/16/2024     ALT 27 05/16/2024     MG 2.3 05/17/2024      Radiology: XR CHEST AP PORTABLE  (CPT=71045)  Result Date: 5/16/2024  CONCLUSION:  No acute cardiopulmonary findings.   LOCATION:  [facility]      Dictated by (CST): Nicanor Mcdonnell MD on 5/16/2024 at 8:37 PM     Finalized by (CST): Nicanor Mcdonnell MD on 5/16/2024 at 8:37 PM         Assessment/Plan:   59 year old female with PMH sig for exercise induced asthma, obesity/ROBBIE p/w SOB and wheezing.     Acute hypoxic respiratory failure  Acute asthma exacerbation triggered by viral URI  - IV steroids  - scheduled nebuilzers  - consult pulm - may need long acting inhaled corticosteroid  - wean O2 as tolerated  - viral symptoms resolved, viral panel would be academic and wouldn't change course of treatment     Obesity/ROBBIE  - cpap qhs     FEN: regular diet, PT/OT  Proph: SCDs, lovenox      5/18/24  Dropping to 88% on RA during ambulation.  Denies CP.  SOB improving.    Temp:  [97.8 °F (36.6 °C)-98.3 °F (36.8 °C)] 98.3 °F (36.8 °C)  Pulse:  [90-94] 91  Resp:  [18] 18  BP: (138-156)/(58-83) 138/58  SpO2:  [92 %-96 %] 94 %    Exam  Gen:  alert and oriented x3, no focal neurologic deficits  Pulm: Lungs clear bilaterally, normal respiratory effort  CV: Heart with regular rate and rhythm, no murmur.  Normal PMI.    Abd: Abdomen soft, nontender, nondistended, no organomegaly, bowel sounds present  MSK: Full range of motion in extremities, no clubbing, no cyanosis  Skin:  no rashes or lesions  Lab 05/16/24 2033 05/17/24  0720 05/18/24  0854   WBC 6.1 5.8 12.2*   HGB 14.1 14.0 13.5   MCV 86.0 89.3 90.3   .0 279.0 299.0      Lab 05/16/24 2033 05/17/24  0720 05/18/24  0854    136 137   K 3.8 4.1 4.3    106 108   CO2 28.0 22.0 22.0   BUN 20 19 25*   CREATSERUM 1.13* 1.10* 1.02   CA 9.1 9.1 9.0   MG  --  2.3 2.6   * 162* 141*      Lab 05/16/24 2033   ALT 27   AST 21   ALB 4.0       Meds:   Scheduled:    fluticasone furoate-vilanterol  1 puff Inhalation Daily    methylPREDNISolone  40 mg Intravenous Q8H    heparin  5,000 Units Subcutaneous Q8H DAVID    ipratropium-albuterol  3 mL Nebulization 6 times per day       Assessment/Plan:  59 year old female with PMH sig for exercise induced asthma, obesity/ROBBIE p/w SOB and wheezing.     Acute hypoxic respiratory failure  Acute asthma exacerbation triggered by viral URI  - IV steroids  - scheduled nebuilzers  - consult pulm - may need long acting inhaled corticosteroid  - wean O2 as tolerated  - viral symptoms resolved, viral panel would be academic and wouldn't change course of treatment  - shourt course of breo per pulm      Obesity/ROBBIE  - cpap qhs     FEN: regular diet, PT/OT  Proph: SCDs, lovenox      PULMONOLOGY  ASSESSMENT/PLAN:  Dyspnea/cough/wheezing - secondary to acute asthma exacerbation, possibly triggered by viral URI. Her CXR was unremarkable.  -covid/flu/RSV negative  -wean O2 as able  -IV steroids - eventually transition to po prednisone taper  -start breo - plan for minimum of short term therapy   -duonebs prn  -needs outpatient pfts  -discussed w/ patient that she may benefit from being on an inhaled corticosteroid at least during times of respiratory infections to help reduce respiratory symptoms and prevent worsening exacerbations. There are several options including Airsupra (albuterol-budesonide) prn or prn Symbicort or Dulera which can be used prn (so-called SMART therapy)  ROBBIE - on CPAP  -continue  CPAP with sleep      DISCHARGE DATE: 5/19/24    Discharge Summary   Admit date: 5/16/2024     Discharge date and time: 05/19/24       Discharge Diagnoses: Severe persistent asthma with exacerbation (HCC) [J45.51]     Discharge Condition: stable     Disposition:  home     Important Follow up:  - PCP within 2 weeks       Hospital Course:    59 year old female with PMH sig for exercise induced asthma, obesity/ROBBIE p/w SOB and wheezing. Reported viral URI symptoms earlier this week that went through her family. She started feeling better but then she started getting SOB and winded very quickly. Feeling SHEPPARD and was getting fatigued.  In the ED she was hyeprtensive and saturating low 90s on RA. Started on iv steroids, nebulizers and admitted for asthma exacerbation.      Acute hypoxic respiratory failure - resolved  Acute asthma exacerbation triggered by viral URI  - IV steroids - transition to prednisone taper on dc   - scheduled nebuilzers  - consult pulm - may need long acting inhaled corticosteroid - dc with breo   - wean O2 as tolerated  - viral symptoms resolved, viral panel would be academic and wouldn't change course of treatment  - shourt course of breo per pulm      Obesity/ROBBIE  - cpap at bedtime    Medications 05/16 05/17 05/18 05/19      albuterol (Ventolin) (5 MG/ML) 0.5% nebulizer solution 10 mg  Dose: 10 mg  Freq: Once Route: Nebulization  Start: 05/16/24 2211 End: 05/16/24 2228   Admin Instructions:   Administer over 1 hour   Order specific questions:       [id number]           albuterol (Ventolin) (5 MG/ML) 0.5% nebulizer solution 10 mg  Dose: 10 mg  Freq: Once Route: Nebulization  Start: 05/16/24 2033 End: 05/16/24 2045   Admin Instructions:   Administer over 1 hour   Order specific questions:       [id number]           ipratropium (Atrovent) 0.02 % nebulizer solution 0.5 mg  Dose: 0.5 mg  Freq: Once Route: Nebulization  Start: 05/16/24 2033 End: 05/16/24 2045   Order specific questions:       53857            methylPREDNISolone sodium succinate (Solu-MEDROL) injection 125 mg  Dose: 125 mg  Freq: Once Route: IV  Start: 05/16/24 2033 End: 05/16/24 2041   Admin Instructions:   Reconstitute with 2ml sterile water or saline    19923              Medications 05/16 05/17 05/18 05/19   fluticasone furoate-vilanterol (Breo Ellipta) 100-25 MCG/ACT inhaler 1 puff  Dose: 1 puff  Freq: Daily (RT) Route: IN  Start: 05/18/24 1245 End: 05/19/24 2016   Admin Instructions:   Administer at the same time every day; do not use more than one inhalation in 24 hours; do not open cover of the inhaler until ready for use (each time cover is opened, 1 dose of medicine is prepared); Patient should rinse mouth with water after inhalation and spit rinse solution  RT To Administer First Dose.      00246      36265        guaiFENesin ER (Mucinex) 12 hr tab 600 mg  Dose: 600 mg  Freq: 2 times daily Route: OR  Start: 05/18/24 2145 End: 05/19/24 2016   Admin Instructions:   Do not crush      01653      89708        heparin (Porcine) 5000 UNIT/ML injection 5,000 Units  Dose: 5,000 Units  Freq: Every 8 hours scheduled Route: SC  Start: 05/17/24 0600 End: 05/19/24 2016     [id number]     [id number]     [id number]      [id number]     [id number]     020985      250652     16        ipratropium-albuterol (Duoneb) 0.5-2.5 (3) MG/3ML inhalation solution 3 mL  Dose: 3 mL  Freq: 6 times per day Route: Nebulization  Start: 05/17/24 0300 End: 05/19/24 2016   Order specific questions:        [id number]     [id number]     [id number]     [id number]     [id number]      037990     240169     [id number]     342459     [id number]     201182      28     [id number]     [id number]     147387     32     33        methylPREDNISolone sodium succinate (Solu-MEDROL) injection 40 mg  Dose: 40 mg  Freq: Every 8 hours Route: IV  Start: 05/17/24 0600 End: 05/19/24 2016   Admin Instructions:   Reconstitute with 1ml sterile water or saline     [id number]     [id number]     [id number]      [id number]     013648     [id number]           Vitals (last day) before  discharge       Date/Time Temp Pulse Resp BP SpO2 Weight O2 Device O2 Flow Rate (L/min) Who    05/19/24 0515 97.9 °F (36.6 °C) 77 18 144/64 93 % -- None (Room air) -- LM    05/18/24 2031 97.9 °F (36.6 °C) 81 18 154/82 94 % -- None (Room air) -- LM    05/18/24 1201 98.3 °F (36.8 °C) 91 18 138/58 94 % -- Nasal cannula 2 L/min AF    05/18/24 0453 97.8 °F (36.6 °C) 90 18 150/80 92 % -- Nasal cannula -- MA

## 2024-11-15 ENCOUNTER — LABORATORY ENCOUNTER (OUTPATIENT)
Dept: LAB | Facility: HOSPITAL | Age: 59
End: 2024-11-15
Attending: SURGERY
Payer: COMMERCIAL

## 2024-11-15 DIAGNOSIS — Z01.818 PRE-OP TESTING: ICD-10-CM

## 2024-11-15 LAB
CHLORIDE SERPL-SCNC: 106 MMOL/L (ref 98–112)
CO2 SERPL-SCNC: 29 MMOL/L (ref 21–32)
POTASSIUM SERPL-SCNC: 4.8 MMOL/L (ref 3.5–5.1)
SODIUM SERPL-SCNC: 137 MMOL/L (ref 136–145)

## 2024-11-15 PROCEDURE — 36415 COLL VENOUS BLD VENIPUNCTURE: CPT

## 2024-11-15 PROCEDURE — 80051 ELECTROLYTE PANEL: CPT

## 2025-01-03 ENCOUNTER — LABORATORY ENCOUNTER (OUTPATIENT)
Dept: LAB | Facility: HOSPITAL | Age: 60
End: 2025-01-03
Attending: SURGERY
Payer: COMMERCIAL

## 2025-01-03 DIAGNOSIS — Z01.818 PRE-OP TESTING: ICD-10-CM

## 2025-01-03 LAB
CHLORIDE SERPL-SCNC: 106 MMOL/L (ref 98–112)
CO2 SERPL-SCNC: 29 MMOL/L (ref 21–32)
POTASSIUM SERPL-SCNC: 4.6 MMOL/L (ref 3.5–5.1)
SODIUM SERPL-SCNC: 141 MMOL/L (ref 136–145)

## 2025-01-03 PROCEDURE — 36415 COLL VENOUS BLD VENIPUNCTURE: CPT

## 2025-01-03 PROCEDURE — 80051 ELECTROLYTE PANEL: CPT

## 2025-01-07 ENCOUNTER — HOSPITAL ENCOUNTER (OUTPATIENT)
Facility: HOSPITAL | Age: 60
Setting detail: HOSPITAL OUTPATIENT SURGERY
Discharge: HOME | End: 2025-01-07
Attending: SURGERY | Admitting: SURGERY
Payer: COMMERCIAL

## 2025-01-07 ENCOUNTER — ANESTHESIA EVENT (OUTPATIENT)
Dept: SURGERY | Facility: HOSPITAL | Age: 60
End: 2025-01-07
Payer: COMMERCIAL

## 2025-01-07 ENCOUNTER — ANESTHESIA (OUTPATIENT)
Dept: SURGERY | Facility: HOSPITAL | Age: 60
End: 2025-01-07
Payer: COMMERCIAL

## 2025-01-07 VITALS
RESPIRATION RATE: 12 BRPM | WEIGHT: 199 LBS | BODY MASS INDEX: 31.23 KG/M2 | HEIGHT: 67 IN | DIASTOLIC BLOOD PRESSURE: 82 MMHG | OXYGEN SATURATION: 98 % | TEMPERATURE: 99 F | SYSTOLIC BLOOD PRESSURE: 141 MMHG | HEART RATE: 78 BPM

## 2025-01-07 DIAGNOSIS — Z01.818 PRE-OP TESTING: Primary | ICD-10-CM

## 2025-01-07 PROCEDURE — 88333 PATH CONSLTJ SURG CYTO XM 1: CPT | Performed by: SURGERY

## 2025-01-07 PROCEDURE — 88341 IMHCHEM/IMCYTCHM EA ADD ANTB: CPT | Performed by: SURGERY

## 2025-01-07 PROCEDURE — 88342 IMHCHEM/IMCYTCHM 1ST ANTB: CPT | Performed by: SURGERY

## 2025-01-07 PROCEDURE — 0GTG0ZZ RESECTION OF LEFT THYROID GLAND LOBE, OPEN APPROACH: ICD-10-PCS | Performed by: SURGERY

## 2025-01-07 PROCEDURE — 88331 PATH CONSLTJ SURG 1 BLK 1SPC: CPT | Performed by: SURGERY

## 2025-01-07 PROCEDURE — 88307 TISSUE EXAM BY PATHOLOGIST: CPT | Performed by: SURGERY

## 2025-01-07 RX ORDER — NALOXONE HYDROCHLORIDE 0.4 MG/ML
80 INJECTION, SOLUTION INTRAMUSCULAR; INTRAVENOUS; SUBCUTANEOUS AS NEEDED
Status: DISCONTINUED | OUTPATIENT
Start: 2025-01-07 | End: 2025-01-07

## 2025-01-07 RX ORDER — LIDOCAINE HYDROCHLORIDE 10 MG/ML
INJECTION, SOLUTION EPIDURAL; INFILTRATION; INTRACAUDAL; PERINEURAL AS NEEDED
Status: DISCONTINUED | OUTPATIENT
Start: 2025-01-07 | End: 2025-01-07 | Stop reason: SURG

## 2025-01-07 RX ORDER — SCOPOLAMINE 1 MG/3D
1 PATCH, EXTENDED RELEASE TRANSDERMAL ONCE
Status: DISCONTINUED | OUTPATIENT
Start: 2025-01-07 | End: 2025-01-07

## 2025-01-07 RX ORDER — ROCURONIUM BROMIDE 10 MG/ML
INJECTION, SOLUTION INTRAVENOUS AS NEEDED
Status: DISCONTINUED | OUTPATIENT
Start: 2025-01-07 | End: 2025-01-07 | Stop reason: SURG

## 2025-01-07 RX ORDER — ONDANSETRON 2 MG/ML
INJECTION INTRAMUSCULAR; INTRAVENOUS AS NEEDED
Status: DISCONTINUED | OUTPATIENT
Start: 2025-01-07 | End: 2025-01-07 | Stop reason: SURG

## 2025-01-07 RX ORDER — LABETALOL HYDROCHLORIDE 5 MG/ML
5 INJECTION, SOLUTION INTRAVENOUS EVERY 5 MIN PRN
Status: DISCONTINUED | OUTPATIENT
Start: 2025-01-07 | End: 2025-01-07

## 2025-01-07 RX ORDER — PROCHLORPERAZINE EDISYLATE 5 MG/ML
5 INJECTION INTRAMUSCULAR; INTRAVENOUS EVERY 8 HOURS PRN
Status: DISCONTINUED | OUTPATIENT
Start: 2025-01-07 | End: 2025-01-07

## 2025-01-07 RX ORDER — HYDROMORPHONE HYDROCHLORIDE 1 MG/ML
0.2 INJECTION, SOLUTION INTRAMUSCULAR; INTRAVENOUS; SUBCUTANEOUS EVERY 5 MIN PRN
Status: DISCONTINUED | OUTPATIENT
Start: 2025-01-07 | End: 2025-01-07

## 2025-01-07 RX ORDER — HYDROCODONE BITARTRATE AND ACETAMINOPHEN 5; 325 MG/1; MG/1
2 TABLET ORAL ONCE AS NEEDED
Status: DISCONTINUED | OUTPATIENT
Start: 2025-01-07 | End: 2025-01-07

## 2025-01-07 RX ORDER — SODIUM CHLORIDE, SODIUM LACTATE, POTASSIUM CHLORIDE, CALCIUM CHLORIDE 600; 310; 30; 20 MG/100ML; MG/100ML; MG/100ML; MG/100ML
INJECTION, SOLUTION INTRAVENOUS CONTINUOUS
Status: DISCONTINUED | OUTPATIENT
Start: 2025-01-07 | End: 2025-01-07

## 2025-01-07 RX ORDER — HYDROMORPHONE HYDROCHLORIDE 1 MG/ML
0.6 INJECTION, SOLUTION INTRAMUSCULAR; INTRAVENOUS; SUBCUTANEOUS EVERY 5 MIN PRN
Status: DISCONTINUED | OUTPATIENT
Start: 2025-01-07 | End: 2025-01-07

## 2025-01-07 RX ORDER — HYDROMORPHONE HYDROCHLORIDE 1 MG/ML
0.4 INJECTION, SOLUTION INTRAMUSCULAR; INTRAVENOUS; SUBCUTANEOUS EVERY 5 MIN PRN
Status: DISCONTINUED | OUTPATIENT
Start: 2025-01-07 | End: 2025-01-07

## 2025-01-07 RX ORDER — DEXAMETHASONE SODIUM PHOSPHATE 4 MG/ML
VIAL (ML) INJECTION AS NEEDED
Status: DISCONTINUED | OUTPATIENT
Start: 2025-01-07 | End: 2025-01-07 | Stop reason: SURG

## 2025-01-07 RX ORDER — HYDROCODONE BITARTRATE AND ACETAMINOPHEN 5; 325 MG/1; MG/1
1 TABLET ORAL ONCE AS NEEDED
Status: DISCONTINUED | OUTPATIENT
Start: 2025-01-07 | End: 2025-01-07

## 2025-01-07 RX ORDER — ACETAMINOPHEN 500 MG
1000 TABLET ORAL ONCE AS NEEDED
Status: DISCONTINUED | OUTPATIENT
Start: 2025-01-07 | End: 2025-01-07

## 2025-01-07 RX ORDER — METOPROLOL TARTRATE 1 MG/ML
2.5 INJECTION, SOLUTION INTRAVENOUS ONCE
Status: DISCONTINUED | OUTPATIENT
Start: 2025-01-07 | End: 2025-01-07

## 2025-01-07 RX ORDER — BUPIVACAINE HYDROCHLORIDE 5 MG/ML
INJECTION, SOLUTION EPIDURAL; INTRACAUDAL AS NEEDED
Status: DISCONTINUED | OUTPATIENT
Start: 2025-01-07 | End: 2025-01-07 | Stop reason: HOSPADM

## 2025-01-07 RX ORDER — ACETAMINOPHEN 500 MG
1000 TABLET ORAL ONCE
Status: DISCONTINUED | OUTPATIENT
Start: 2025-01-07 | End: 2025-01-07 | Stop reason: HOSPADM

## 2025-01-07 RX ORDER — ONDANSETRON 2 MG/ML
4 INJECTION INTRAMUSCULAR; INTRAVENOUS EVERY 6 HOURS PRN
Status: DISCONTINUED | OUTPATIENT
Start: 2025-01-07 | End: 2025-01-07

## 2025-01-07 RX ORDER — IBUPROFEN 800 MG/1
800 TABLET, FILM COATED ORAL EVERY 8 HOURS PRN
Qty: 20 TABLET | Refills: 1 | Status: SHIPPED | OUTPATIENT
Start: 2025-01-07 | End: 2025-01-09

## 2025-01-07 RX ADMIN — LIDOCAINE HYDROCHLORIDE 50 MG: 10 INJECTION, SOLUTION EPIDURAL; INFILTRATION; INTRACAUDAL; PERINEURAL at 13:27:00

## 2025-01-07 RX ADMIN — ONDANSETRON 4 MG: 2 INJECTION INTRAMUSCULAR; INTRAVENOUS at 13:32:00

## 2025-01-07 RX ADMIN — DEXAMETHASONE SODIUM PHOSPHATE 8 MG: 4 MG/ML VIAL (ML) INJECTION at 13:32:00

## 2025-01-07 RX ADMIN — ROCURONIUM BROMIDE 10 MG: 10 INJECTION, SOLUTION INTRAVENOUS at 13:27:00

## 2025-01-07 RX ADMIN — SODIUM CHLORIDE, SODIUM LACTATE, POTASSIUM CHLORIDE, CALCIUM CHLORIDE: 600; 310; 30; 20 INJECTION, SOLUTION INTRAVENOUS at 13:38:00

## 2025-01-07 RX ADMIN — SODIUM CHLORIDE, SODIUM LACTATE, POTASSIUM CHLORIDE, CALCIUM CHLORIDE: 600; 310; 30; 20 INJECTION, SOLUTION INTRAVENOUS at 13:22:00

## 2025-01-07 NOTE — ANESTHESIA PROCEDURE NOTES
Airway  Date/Time: 1/7/2025 1:29 PM  Urgency: elective      General Information and Staff    Patient location during procedure: OR  Anesthesiologist: Tennille Huerta MD  Performed: anesthesiologist   Performed by: Tennille Huerta MD  Authorized by: Tennille Huerta MD      Indications and Patient Condition  Indications for airway management: anesthesia  Spontaneous Ventilation: absent  Sedation level: deep  Preoxygenated: yes  Patient position: sniffing  Mask difficulty assessment: 1 - vent by mask    Final Airway Details  Final airway type: endotracheal airway      Successful airway: ETT and NIM tube  Cuffed: yes   Successful intubation technique: direct laryngoscopy  Endotracheal tube insertion site: oral  Blade: Meg  Blade size: #3  ETT size (mm): 7.0    Cormack-Lehane Classification: grade I - full view of glottis  Placement verified by: capnometry   Cuff volume (mL): 6  Measured from: lips  Number of attempts at approach: 1

## 2025-01-07 NOTE — ANESTHESIA POSTPROCEDURE EVALUATION
[facility]    Alie Paniagua Patient Status:  Hospital Outpatient Surgery   Age/Gender 59 year old female MRN [id number]   Location [facility] POST ANESTHESIA CARE UNIT Attending Jaleesa Green MD   Hosp Day # 0 PCP Sunshine Lance MD       Anesthesia Post-op Note    LEFT THYROID LOBECTOMY WITH INTRAOPERATIVE FROZEN SECTION, NERVE MONITORING    Procedure Summary       Date: 01/07/25 Room / Location:  MAIN OR 04 /  MAIN OR    Anesthesia Start: 1322 Anesthesia Stop: 1445    Procedure: LEFT THYROID LOBECTOMY WITH INTRAOPERATIVE FROZEN SECTION, NERVE MONITORING (Left: Neck) Diagnosis: (THYROID NODULE)    Surgeons: Jaleesa Green MD Anesthesiologist: Tennille Huerta MD    Anesthesia Type: general ASA Status: 3            Anesthesia Type: general    Vitals Value Taken Time   /84 01/07/25 1446   Temp 97 01/07/25 1450   Pulse 77 01/07/25 1450   Resp 14 01/07/25 1450   SpO2 96 % 01/07/25 1450   Vitals shown include unfiled device data.        Patient Location: PACU    Anesthesia Type: general    Airway Patency: patent    Postop Pain Control: adequate    Mental Status: mildly sedated but able to meaningfully participate in the post-anesthesia evaluation    Cardiopulmonary/Hydration status: stable euvolemic    Complications: no apparent anesthesia related complications    Postop vital signs: stable    Dental Exam: Unchanged from Preop    Patient to be discharged from PACU when criteria met.

## 2025-01-07 NOTE — ANESTHESIA PREPROCEDURE EVALUATION
PRE-OP EVALUATION    Patient Name: Alie Paniagua    Admit Diagnosis: THYROID NODULE    Pre-op Diagnosis: THYROID NODULE    LEFT THYROID LOBECTOMY WITH INTRAOPERATIVE FROZEN SECTION, NERVE MONITORING , POSSIBLE TOTAL THYROIDECTOMY    Anesthesia Procedure: LEFT THYROID LOBECTOMY WITH INTRAOPERATIVE FROZEN SECTION, NERVE MONITORING , POSSIBLE TOTAL THYROIDECTOMY (Left)    Surgeons and Role:     * Jaleesa Green MD - Primary    Pre-op vitals reviewed.  Temp: 98.2 °F (36.8 °C)  Pulse: 68  Resp: 16  BP: 122/80  SpO2: 98 %  Body mass index is 31.17 kg/m².    Current medications reviewed.  Hospital Medications:   [Transfer Hold] acetaminophen (Tylenol Extra Strength) tab 1,000 mg  1,000 mg Oral Once    scopolamine (Transderm-Scop) 1 MG/3DAYS patch 1 patch  1 patch Transdermal Once    lactated ringers infusion   Intravenous Continuous       Outpatient Medications:   Prescriptions Prior to Admission[1]    Allergies: Codeine      Anesthesia Evaluation    Patient summary reviewed.    Anesthetic Complications  (+) history of anesthetic complications  History of: PONV       GI/Hepatic/Renal    Negative GI/hepatic/renal ROS.                             Cardiovascular        Exercise tolerance: good     MET: >4    (+) obesity                                       Endo/Other  Comment: THYROID NODULE                                Pulmonary      (+) asthma              (+) sleep apnea and CPAP      Neuro/Psych      (+) depression  (+) anxiety         (+) neuromuscular disease (Periodic limb movement disorder)             History of melanoma        Past Surgical History:   Procedure Laterality Date    Carpal tunnel release Right 2022    Carpal tunnel release Left 2022    Colonoscopy  02/2017     diverticulosis, hemorrhoids, polyp (7 mm serrated polyp). repeat 5 years       Colonoscopy N/A 02/13/2017    Procedure: COLONOSCOPY, POSSIBLE BIOPSY, POSSIBLE POLYPECTOMY 84192;  Surgeon: Gatito Wylie MD;  [place] [facility] [place]  [facility]    Ct heart w/ calcium scoring  1/24/14 - [facility]    score 0    Other surgical history      sentinel node biopsy    Other surgical history      benign breast lump    Other surgical history      many mole removals    Plantar fascia release Right     Gastrocnemius recession     Social History     Socioeconomic History    Marital status:    Occupational History    Occupation: homemaker   Tobacco Use    Smoking status: Never    Smokeless tobacco: Never   Vaping Use    Vaping status: Never Used   Substance and Sexual Activity    Alcohol use: Yes     Comment: once a month    Drug use: No   Other Topics Concern    Seat Belt Yes     History   Drug Use No     Available pre-op labs reviewed.     Lab Results   Component Value Date     01/03/2025    K 4.6 01/03/2025     01/03/2025    CO2 29.0 01/03/2025            Airway      Mallampati: II  Mouth opening: >3 FB  TM distance: > 6 cm  Neck ROM: full Cardiovascular    Cardiovascular exam normal.         Dental    Dentition appears grossly intact         Pulmonary    Pulmonary exam normal.                 Other findings            ASA: 3   Plan: general  NPO status verified and patient meets guidelines.    Post-procedure pain management plan discussed with surgeon and patient.    Comment: Plan for general anesthetic with endotracheal tube.  Risks and benefits pertaining to the proposed anesthetic and postoperative plan for pain, nausea/vomiting were discussed with patient.  Risks of general anesthetic include but not are not limited to adverse reactions related to medications administered, dental damage, and sore throat postoperatively.  Questions were answered and consent was signed.   Plan/risks discussed with: patient              Present on Admission:  **None**           [1]   Medications Prior to Admission   Medication Sig Dispense Refill Last Dose/Taking    Cyanocobalamin (VITAMIN B 12 OR) Take by mouth.   1/6/2025 at  7:00 AM    [facility]  10 MG/0.5ML Subcutaneous Solution Auto-injector Inject 10 mg into the skin once a week. Injections on Tuesdays 12/20/2024    buPROPion ER (WELLBUTRIN XL) 300 MG Oral Tablet 24 Hr Take 1 tablet (300 mg total) by mouth daily with breakfast. 90 tablet 0 1/7/2025 at  7:40 AM    propranolol 20 MG Oral Tab Take 1 tablet (20 mg total) by mouth 3 (three) times daily. (Patient taking differently: Take 1 tablet (20 mg total) by mouth 3 (three) times daily.) 270 tablet 0 1/7/2025 at  7:40 AM    acidophilus-pectin Oral Cap Take 1 capsule by mouth daily.   1/6/2025 at  7:00 AM    gabapentin 300 MG Oral Cap Take 1 capsule (300 mg total) by mouth 2 (two) times daily.   1/7/2025 at  7:40 AM    Cholecalciferol (VITAMIN D) 50 MCG (2000 UT) Oral Cap Take 1 capsule (2,000 Units total) by mouth daily. 100 capsule prn 1/6/2025 at  7:00 AM    albuterol (VENTOLIN HFA) 108 (90 Base) MCG/ACT Inhalation Aero Soln INHALE 2 PUFFS INTO THE LUNGS EVERY 6 (SIX) HOURS AS NEEDED FOR WHEEZING OR SHORTNESS OF BREATH. 18 g 3 1/1/2025    ELDERBERRY OR Take 400 mg by mouth.   12/27/2024    Ascorbic Acid (VITAMIN C) 500 MG Oral Tab Take by mouth.   1/6/2025 at  7:00 AM    Omega-3 Fatty Acids (FISH OIL) 1000 MG Oral Cap Take by mouth.   1/5/2025    Multiple Vitamins-Minerals (MULTI VITAMIN/MINERALS OR) Take by mouth.   1/6/2025 at  7:00 AM    Pancrelipase, Lip-Prot-Amyl, (ZENPEP OR) Take by mouth.   More than a month

## 2025-01-07 NOTE — H&P
[facility]  Report of history and physical    Alie Paniagua Patient Status:  Hospital Outpatient Surgery    3/21/1965 MRN [id number]   Location [facility] PERIOPERATIVE SERVICE Attending Jaleesa Green MD   Hosp Day # 0 PCP Sunshine Lance MD     Reason for surgery: left thyroid goiter    History of Present Illness:  Alie Paniagua is a a(n) 59 year old female. Patient is here for left thyroid lobectomy.  Patient did not notice any lump on the neck but during the CT of the chest exam found a nodule. Outpatient US of the thyroid gland confirmed the nodules and here for possible biopsy. No obstructive symptoms. No symptoms associated with hypo- or hyperthyrodism. Family history of thyroid disease was not significant. She had chocking sensation after eating a piece of chicken.      History:  Past Medical History:    Anxiety state    Asthma (HCC)    EXTREME ASTHMA EXACERBATION MAY 2024    COVID    Depression    Disorder of thyroid    Humerus fracture    Hx of motion sickness    Melanoma (HCC)    melanoma - right middle finger    Obesity    ROBBIE (obstructive sleep apnea)    PONV (postoperative nausea and vomiting)    RLS (restless legs syndrome)    Sleep apnea    CPAP    Visual impairment     Past Surgical History:   Procedure Laterality Date    Carpal tunnel release Right     Carpal tunnel release Left     Colonoscopy  2017     diverticulosis, hemorrhoids, polyp (7 mm serrated polyp). repeat 5 years       Colonoscopy N/A 2017    Procedure: COLONOSCOPY, POSSIBLE BIOPSY, POSSIBLE POLYPECTOMY 72081;  Surgeon: Gatito Wylie MD;  [facility]    Ct heart w/ calcium scoring  14 [place] [facility] [place]    score 0    Other surgical history      sentinel node biopsy    Other surgical history      benign breast lump    Other surgical history      many mole removals    Plantar fascia release Right     Gastrocnemius recession     Family History   Problem Relation Age of  Onset    Depression Mother         For a long time after pt's father passed away    Diabetes Father         Type 2    High Blood Pressure Brother     Lipids Brother     Diabetes Maternal Grandmother         Type 2    Stroke Maternal Grandfather         at 99 y/o    Diabetes Paternal Grandmother         Type 2    Heart Disorder Paternal Grandfather         May have had a heart issue    No Known Problems Daughter     No Known Problems Daughter     ADHD Son     Depression Son         May have depression    Melanoma Cousin     Arthritis Cousin     Other (Lupus) Maternal Cousin Female     Bipolar Disorder Neg     Schizophrenia Neg     Substance Abuse Neg     Suicide History Neg       reports that she has never smoked. She has never used smokeless tobacco. She reports current alcohol use. She reports that she does not use drugs.    Allergies:  Allergies[1]    Medications:    Current Facility-Administered Medications:     acetaminophen (Tylenol Extra Strength) tab 1,000 mg, 1,000 mg, Oral, Once    scopolamine (Transderm-Scop) 1 MG/3DAYS patch 1 patch, 1 patch, Transdermal, Once    lactated ringers infusion, , Intravenous, Continuous    Review of Systems:    GENERAL HEALTH: otherwise feels well, no weight loss, no fever or chills  SKIN: denies any unusual skin   HEENT: denies nasal congestion  RESPIRATORY: denies shortness of breath  CARDIOVASCULAR: no palpitations   GI: denies nausea, vomiting  GENITAL/: no dysuria  MUSCULOSKELETAL: no joint complaints upper or lower extremities  HEMATOLOGY: denies bruising or excessive bleeding    Physical Exam:  Blood pressure 122/80, pulse 68, temperature 98.2 °F (36.8 °C), temperature source Oral, resp. rate 16, height 5' 7\" (1.702 m), weight 199 lb (90.3 kg), SpO2 98%, not currently breastfeeding.    General: Alert, orientated x3.  Cooperative.  No apparent distress.  HEENT: Exam is unremarkable. Left palpable thyroid goiter  Lungs: no labored breathing  Cardiac: Regular rate    Abdomen:  soft  Extremities:  No lower extremity edema noted.    Skin: Normal texture and turgor.  Neurologic: Cranial nerves are grossly intact.  Motor strength and sensory examination is grossly normal.  No focal neurologic deficit.    Laboratory Data:     Outpatient Ultrasound:   DATE OF SERVICE: 08.12.2024  US THYROID (ZZP=[id number])    CLINICAL INDICATION: Thyroid nodule.    COMPARISON: None available.    TECHNIQUE: Gray scale imaging of the thyroid gland was performed and multiple static images were  obtained.    FINDINGS:    RIGHT LOBE: The right lobe of the thyroid gland measures 5.2 x 2.4 x 1.8 cm. Total volume is 11.9  mL.  LEFT LOBE: The left lobe of the thyroid gland measures 6.4 x 2.0 x 2.8 cm. Total volume is 18.3 mL.  ISTHMUS: The isthmus measures up to 0.3 cm.    ECHOTEXTURE:   The right thyroid gland echogenicity appears homogeneous.  The left thyroid gland echogenicity appears homogeneous.    VASCULARITY:   The right thyroid gland vascularity appears normal.  The left thyroid gland vascularity appears normal.    NODULES:  Nodule 1: Right mid pole, measuring 0.5 x 0.7 x 0.3 cm.  Solid or almost completely solid: 2 points, hyperechoic or isoechoic: 1 point, wider-than-tall: 0  points, smooth: 0 points, none or large comet-tail artifacts: 0 points    Nodule 2: Left mid pole, measuring 3.5 x 1.9 x 3.0 cm.  Solid or almost completely solid: 2 points, hypoechoic: 2 points, taller-than-wide: 3 points,  ill-defined: 0 points, macrocalcifications: 1 point    Impression   IMPRESSION:  1. Nodule 1, TR-3, measuring 0.5 x 0.7 x 0.3 cm. Recommend: Does not meet criteria for follow up  according to ACR-TIRADS guidelines.  2. Nodule 2, TR-5, measuring 3.5 x 1.9 x 3.0 cm. Recommend: Meets criteria for FNA if not previously  performed and if the TSH is not low.  3. Thyromegaly.     Real-time US: as above    Uptake scan: not done    Thyroid function test:   Component  Latest Ref Rng 10/5/2023   TSH  0.270 - 4.200 mIU/L  1.280     Fine needle aspiration: deferred to surgery    Impression and Plan: left thyroid goiter    -will proceed with left thyroid lobectomy with IO frozen section and possible total with BNM today      Jaleesa Green MD  1/7/2025  12:26 PM         [1]   Allergies  Allergen Reactions    Codeine NAUSEA AND VOMITING

## 2025-01-07 NOTE — BRIEF OP NOTE
Pre-Operative Diagnosis: LEFT THYROID NODULE     Post-Operative Diagnosis: LEFT THYROID NODULE      Procedure Performed:   LEFT THYROID LOBECTOMY WITH INTRAOPERATIVE FROZEN SECTION, NERVE MONITORING    Surgeons and Role:     * Jaleesa Green MD - Primary    Assistant(s):  Surgical Assistant.: Keisha Hussein CSA     Surgical Findings: FOLLICULAR LESION     Specimen: LEFT THYROID GLAND     Estimated Blood Loss: Blood Output: 5 mL (1/7/2025  2:33 PM)    Dictation Number:  NA    Jaleesa Green MD  1/7/2025  2:36 PM

## 2025-01-08 NOTE — OPERATIVE REPORT
[facility]    PATIENT'S NAME: STACY PAUL   ATTENDING PHYSICIAN: Jaleesa Green M.D.   OPERATING PHYSICIAN: Jaleesa Green M.D.   PATIENT ACCOUNT#:   [id number]    [facility]  MEDICAL RECORD #:   [id number]       YOB: 1965  ADMISSION DATE:       01/07/2025      OPERATION DATE:  01/07/2025    OPERATIVE REPORT      PREOPERATIVE DIAGNOSIS:  Left thyroid goiter.  POSTOPERATIVE DIAGNOSIS:  Left thyroid goiter.  PROCEDURE:  Left thyroid lobectomy with intraoperative frozen section and neuromonitoring.    ASSISTANT:  DANIEL Albert    ANESTHESIA:  General.    ESTIMATED BLOOD LOSS:  5 mL.    SPECIMEN:  Left thyroid gland.    DISPOSITION:  To PACU.    COMPLICATIONS:  None.    INDICATIONS:  Patient is a 59-year-old female who presented to the office for evaluation of a thyroid nodule that was found on the CT scan of the chest.  She had an ultrasound of the thyroid gland and it showed a 3.5 cm.  The patient mentioned that she has some choking sensation after eating solid food.  Ultrasound was reviewed and she has a subcentimeter nodule on the right side and then the left middle, there was a 3.5 cm.  Discussed the pros and cons of the biopsy versus surgery, and patient opted to undergo surgery due to her symptoms.  The risks and benefits of surgery were discussed, and patient agreed for the procedure, signed the informed consent.    FINDINGS:  Follicular lesion on frozen section.    OPERATIVE TECHNIQUE:  Patient was brought to the operating room, was placed in supine position on the operating table.  Lower extremity SCD boots were placed.  After IV sedation and neuromonitoring, endotracheal tube placement, both arms were tucked at side and a roll was placed under the shoulder blade, neck was slightly hyperextended.  Then the area was prepped into a sterile field.  Lower Kocher neck incision was made with a 15 blade.  Electric Bovie cautery was used to separate the  subcutaneous tissue, and the superior and inferior flaps raised.  Deep cervical fascia was identified, midline incision was made, and then the left strap muscle was carefully dissected from the thyroid capsule.  Using the Harmonic scalpel, the superior and inferior poles were divided as well as the middle thyroid vein.  As the gland was medially rotated, both parathyroid glands were identified and preserved, and the nerve and artery identified.  The artery was divided and the nerve had a good signal on the monitor, then released from the ligament of Lozano and pretracheal fascia and transected right at the isthmus.  Removed gland was sent down to Pathology for evaluation.  In the meantime, all the blood vessels that were divided using the Harmonic scalpel was reinforced using 2-0, 3-0 silk ties.  After obtaining good hemostasis, a piece of Surgicel laid.  The results of the frozen section came back as a follicular lesion without any obvious evidence of a papillary carcinoma.  At this time, 3-0 Vicryl was used to close the deep cervical and dermal layer.  Local anesthetic was injected into the surrounding tissue and a 4-0 Monocryl for the skin placing a subcuticular stitch.  Incision was then covered with Steri-Strips, 4 x 4, and tape.  The patient tolerated the procedure well, was extubated in the operating room, and transferred to PACU in stable condition.  At the end of the case, needle, instrument, and sponge counts were all correct.    The surgical assistant was medically necessary for the entire procedure to position the patient, prep the area, drape, retract the wound using instruments, and assist in closing and transferring patient out of the room.    Dictated By Jaleesa Green M.D.  d: 01/07/2025 14:39:54  t: 01/07/2025 23:22:28  Job [id number]  TL/

## (undated) DEVICE — 3M™ MICROPORE™ TAPE, 1530-2: Brand: 3M™ MICROPORE™

## (undated) DEVICE — SUT PERMA- 2-0 30IN NABSRB BLK TIE SILK

## (undated) DEVICE — 3M™ STERI-STRIP™ REINFORCED ADHESIVE SKIN CLOSURES, R1547, 1/2 IN X 4 IN (12 MM X 100 MM), 6 STRIPS/ENVELOPE: Brand: 3M™ STERI-STRIP™

## (undated) DEVICE — THYROID: Brand: MEDLINE INDUSTRIES, INC.

## (undated) DEVICE — GLOVE,SURG,SENSICARE SLT,LF,PF,6.5: Brand: MEDLINE

## (undated) DEVICE — ABSORBABLE HEMOSTAT (OXIDIZED REGENERATED CELLULOSE): Brand: SURGICEL

## (undated) DEVICE — SUT PERMA- 3-0 30IN NABSRB BLK TIE SILK

## (undated) DEVICE — SLEEVE COMPR MD KNEE LEN SGL USE KENDALL SCD

## (undated) DEVICE — SOLUTION IRRIG 1000ML 0.9% NACL USP BTL

## (undated) DEVICE — ANTIBACTERIAL UNDYED BRAIDED (POLYGLACTIN 910), SYNTHETIC ABSORBABLE SUTURE: Brand: COATED VICRYL

## (undated) DEVICE — EMG TUBE 8229707 NIM TRIVANTAGE 7.0MM ID: Brand: NIM TRIVANTAGE™

## (undated) DEVICE — PROBE 8225101 5PK STD PRASS FL TIP ROHS

## (undated) DEVICE — MONITORING NEUROPHYSIOLOGICAL

## (undated) DEVICE — HARMONIC 1100 SHEARS, 20CM SHAFT LENGTH: Brand: HARMONIC

## (undated) DEVICE — SUT MCRYL 4-0 18IN PS-2 ABSRB UD 19MM 3/8 CIR